# Patient Record
Sex: FEMALE | Race: WHITE | Employment: UNEMPLOYED | URBAN - METROPOLITAN AREA
[De-identification: names, ages, dates, MRNs, and addresses within clinical notes are randomized per-mention and may not be internally consistent; named-entity substitution may affect disease eponyms.]

---

## 2021-07-29 PROBLEM — N80.30 ENDOMETRIOSIS OF PELVIC PERITONEUM: Status: ACTIVE | Noted: 2019-08-15

## 2021-07-29 PROBLEM — L65.9 HAIR THINNING: Status: ACTIVE | Noted: 2019-08-15

## 2021-07-29 PROBLEM — R63.5 WEIGHT GAIN: Status: ACTIVE | Noted: 2019-08-15

## 2021-07-29 PROBLEM — R61 HYPERHIDROSIS: Status: ACTIVE | Noted: 2019-08-15

## 2021-09-13 PROBLEM — Z80.0 FH: COLON CANCER IN RELATIVE DIAGNOSED AT >50 YEARS OLD: Status: ACTIVE | Noted: 2021-09-13

## 2021-09-13 PROBLEM — K58.2 IRRITABLE BOWEL SYNDROME WITH BOTH CONSTIPATION AND DIARRHEA: Status: ACTIVE | Noted: 2021-09-13

## 2021-09-13 PROBLEM — Z86.010 HX OF COLONIC POLYPS: Status: ACTIVE | Noted: 2021-09-13

## 2021-09-13 PROBLEM — K62.5 RECTAL BLEEDING: Status: ACTIVE | Noted: 2021-09-13

## 2021-11-22 ENCOUNTER — HOSPITAL ENCOUNTER (OUTPATIENT)
Dept: LAB | Age: 46
Discharge: HOME | End: 2021-11-22

## 2021-11-22 PROCEDURE — 88305 TISSUE EXAM BY PATHOLOGIST: CPT

## 2021-11-30 PROBLEM — N80.C2: Status: ACTIVE | Noted: 2021-11-30

## 2021-12-30 ENCOUNTER — HOSPITAL ENCOUNTER (OUTPATIENT)
Dept: SURGERY | Age: 46
Discharge: HOME | End: 2021-12-30

## 2022-03-18 PROBLEM — Z80.0 FH: COLON CANCER IN RELATIVE DIAGNOSED AT >50 YEARS OLD: Status: ACTIVE | Noted: 2021-09-13

## 2022-03-19 PROBLEM — N80.30 ENDOMETRIOSIS OF PELVIC PERITONEUM: Status: ACTIVE | Noted: 2019-08-15

## 2022-03-19 PROBLEM — R63.5 WEIGHT GAIN: Status: ACTIVE | Noted: 2019-08-15

## 2022-03-19 PROBLEM — L65.9 HAIR THINNING: Status: ACTIVE | Noted: 2019-08-15

## 2022-03-19 PROBLEM — N80.C2: Status: ACTIVE | Noted: 2021-11-30

## 2022-03-19 PROBLEM — K58.2 IRRITABLE BOWEL SYNDROME WITH BOTH CONSTIPATION AND DIARRHEA: Status: ACTIVE | Noted: 2021-09-13

## 2022-03-20 PROBLEM — R61 HYPERHIDROSIS: Status: ACTIVE | Noted: 2019-08-15

## 2022-03-20 PROBLEM — K62.5 RECTAL BLEEDING: Status: ACTIVE | Noted: 2021-09-13

## 2022-03-20 PROBLEM — Z86.010 HX OF COLONIC POLYPS: Status: ACTIVE | Noted: 2021-09-13

## 2022-03-20 PROBLEM — Z86.0100 HX OF COLONIC POLYPS: Status: ACTIVE | Noted: 2021-09-13

## 2022-08-30 ENCOUNTER — HOSPITAL ENCOUNTER (EMERGENCY)
Age: 47
Discharge: HOME | End: 2022-08-30
Attending: EMERGENCY MEDICINE
Payer: MEDICARE

## 2022-08-30 ENCOUNTER — APPOINTMENT (OUTPATIENT)
Dept: GENERAL RADIOLOGY | Age: 47
End: 2022-08-30
Payer: MEDICARE

## 2022-08-30 VITALS
SYSTOLIC BLOOD PRESSURE: 106 MMHG | WEIGHT: 218 LBS | HEIGHT: 66 IN | TEMPERATURE: 99 F | BODY MASS INDEX: 35.03 KG/M2 | DIASTOLIC BLOOD PRESSURE: 76 MMHG | RESPIRATION RATE: 12 BRPM | HEART RATE: 83 BPM | OXYGEN SATURATION: 98 %

## 2022-08-30 DIAGNOSIS — R07.9 ACUTE CHEST PAIN: Primary | ICD-10-CM

## 2022-08-30 DIAGNOSIS — R07.89 CHEST WALL PAIN: ICD-10-CM

## 2022-08-30 LAB
ALBUMIN SERPL-MCNC: 4.4 G/DL (ref 3.5–5)
ALBUMIN/GLOB SERPL: 1.5 {RATIO}
ALP SERPL-CCNC: 90 U/L (ref 45–117)
ALT SERPL-CCNC: 36 U/L (ref 13–61)
ANION GAP SERPL CALC-SCNC: 10 MMOL/L (ref 7–16)
AST SERPL-CCNC: 29 U/L (ref 15–37)
BILIRUB SERPL-MCNC: <0.2 MG/DL (ref 0.2–1.1)
BUN SERPL-MCNC: 9 MG/DL (ref 7–18)
CALCIUM SERPL-MCNC: 9.1 MG/DL (ref 8.3–10.4)
CHLORIDE SERPL-SCNC: 109 MMOL/L (ref 98–107)
CO2 SERPL-SCNC: 24 MMOL/L (ref 21–32)
CREAT SERPL-MCNC: 0.85 MG/DL (ref 0.6–1)
D DIMER PPP FEU-MCNC: 0.29 UG/ML(FEU)
ERYTHROCYTE [DISTWIDTH] IN BLOOD BY AUTOMATED COUNT: 13.3 % (ref 11.9–14.6)
GLOBULIN SER CALC-MCNC: 3 G/DL (ref 2.3–3.5)
GLUCOSE SERPL-MCNC: 110 MG/DL (ref 65–100)
HCT VFR BLD AUTO: 39.7 % (ref 35.8–46.3)
HGB BLD-MCNC: 13.2 G/DL (ref 11.7–15.4)
MCH RBC QN AUTO: 29.2 PG (ref 26.1–32.9)
MCHC RBC AUTO-ENTMCNC: 33.2 G/DL (ref 31.4–35)
MCV RBC AUTO: 87.8 FL (ref 79.6–97.8)
NRBC # BLD: 0 K/UL (ref 0–0.2)
PLATELET # BLD AUTO: 331 K/UL (ref 150–450)
PMV BLD AUTO: 8.8 FL (ref 9.4–12.3)
POTASSIUM SERPL-SCNC: 3.6 MMOL/L (ref 3.5–5.1)
PROT SERPL-MCNC: 7.4 G/DL (ref 6.4–8.2)
RBC # BLD AUTO: 4.52 M/UL (ref 4.05–5.2)
SARS-COV-2 RDRP RESP QL NAA+PROBE: NOT DETECTED
SODIUM SERPL-SCNC: 143 MMOL/L (ref 136–145)
SOURCE: NORMAL
TROPONIN T SERPL HS-MCNC: <6 NG/L (ref 0–14)
WBC # BLD AUTO: 10.8 K/UL (ref 4.3–11.1)

## 2022-08-30 PROCEDURE — 71046 X-RAY EXAM CHEST 2 VIEWS: CPT

## 2022-08-30 PROCEDURE — 84484 ASSAY OF TROPONIN QUANT: CPT

## 2022-08-30 PROCEDURE — 6360000002 HC RX W HCPCS: Performed by: EMERGENCY MEDICINE

## 2022-08-30 PROCEDURE — 80053 COMPREHEN METABOLIC PANEL: CPT

## 2022-08-30 PROCEDURE — 85027 COMPLETE CBC AUTOMATED: CPT

## 2022-08-30 PROCEDURE — 87635 SARS-COV-2 COVID-19 AMP PRB: CPT

## 2022-08-30 PROCEDURE — 96374 THER/PROPH/DIAG INJ IV PUSH: CPT

## 2022-08-30 PROCEDURE — 96375 TX/PRO/DX INJ NEW DRUG ADDON: CPT

## 2022-08-30 PROCEDURE — 99285 EMERGENCY DEPT VISIT HI MDM: CPT

## 2022-08-30 PROCEDURE — 85379 FIBRIN DEGRADATION QUANT: CPT

## 2022-08-30 RX ORDER — CYCLOBENZAPRINE HCL 10 MG
10 TABLET ORAL 3 TIMES DAILY PRN
Qty: 21 TABLET | Refills: 0 | Status: SHIPPED | OUTPATIENT
Start: 2022-08-30 | End: 2022-09-09

## 2022-08-30 RX ORDER — KETOROLAC TROMETHAMINE 15 MG/ML
15 INJECTION, SOLUTION INTRAMUSCULAR; INTRAVENOUS
Status: COMPLETED | OUTPATIENT
Start: 2022-08-30 | End: 2022-08-30

## 2022-08-30 RX ORDER — MORPHINE SULFATE 2 MG/ML
2 INJECTION, SOLUTION INTRAMUSCULAR; INTRAVENOUS
Status: COMPLETED | OUTPATIENT
Start: 2022-08-30 | End: 2022-08-30

## 2022-08-30 RX ORDER — IBUPROFEN 800 MG/1
800 TABLET ORAL
Qty: 15 TABLET | Refills: 0 | Status: SHIPPED | OUTPATIENT
Start: 2022-08-30 | End: 2022-09-04

## 2022-08-30 RX ADMIN — KETOROLAC TROMETHAMINE 15 MG: 15 INJECTION, SOLUTION INTRAMUSCULAR; INTRAVENOUS at 22:38

## 2022-08-30 RX ADMIN — MORPHINE SULFATE 2 MG: 2 INJECTION, SOLUTION INTRAMUSCULAR; INTRAVENOUS at 22:42

## 2022-08-30 ASSESSMENT — PAIN DESCRIPTION - DESCRIPTORS
DESCRIPTORS: SHOOTING
DESCRIPTORS: SHOOTING
DESCRIPTORS: SHARP

## 2022-08-30 ASSESSMENT — PAIN SCALES - GENERAL
PAINLEVEL_OUTOF10: 2
PAINLEVEL_OUTOF10: 2
PAINLEVEL_OUTOF10: 8
PAINLEVEL_OUTOF10: 7
PAINLEVEL_OUTOF10: 7

## 2022-08-30 ASSESSMENT — ENCOUNTER SYMPTOMS
ABDOMINAL PAIN: 0
BACK PAIN: 1
SHORTNESS OF BREATH: 0
COUGH: 0
VOMITING: 0

## 2022-08-30 ASSESSMENT — PAIN DESCRIPTION - LOCATION
LOCATION: BACK
LOCATION: BACK
LOCATION: BACK;ARM
LOCATION: BACK
LOCATION: CHEST

## 2022-08-30 ASSESSMENT — PAIN DESCRIPTION - FREQUENCY: FREQUENCY: CONTINUOUS

## 2022-08-30 ASSESSMENT — PAIN DESCRIPTION - ORIENTATION
ORIENTATION: MID
ORIENTATION: LEFT
ORIENTATION: UPPER
ORIENTATION: UPPER

## 2022-08-30 ASSESSMENT — PAIN DESCRIPTION - PAIN TYPE: TYPE: ACUTE PAIN

## 2022-08-30 ASSESSMENT — PAIN - FUNCTIONAL ASSESSMENT
PAIN_FUNCTIONAL_ASSESSMENT: 0-10
PAIN_FUNCTIONAL_ASSESSMENT: 0-10

## 2022-08-30 NOTE — ED TRIAGE NOTES
Pt ambulates to triage under NAD at this time.  Reports mid chest pain for approx 2 weeks that radiates to her back and left arm
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
## 2022-08-31 NOTE — ED NOTES
I have reviewed discharge instructions with the patient. The patient verbalized understanding. Patient left ED via Discharge Method: ambulatory to Home with family member. Opportunity for questions and clarification provided. Patient given 2 scripts. To continue your aftercare when you leave the hospital, you may receive an automated call from our care team to check in on how you are doing. This is a free service and part of our promise to provide the best care and service to meet your aftercare needs.  If you have questions, or wish to unsubscribe from this service please call [phone]. Thank you for Choosing our [facility] Emergency Department.         Niall Plascencia RN  08/30/22 [id number]

## 2022-08-31 NOTE — DISCHARGE INSTRUCTIONS
Rest.  Heating pad or hot water bottle. Medications as directed. No obvious signs of a heart attack. If pain persist, you may consider calling cardiology for appointment to recheck to see if further testing is indicated. Also call your primary care doctor to recheck as well. Recheck sooner for worse pain high fever rash or shortness of breath.

## 2022-09-06 DIAGNOSIS — L65.9 HAIR THINNING: ICD-10-CM

## 2022-09-06 DIAGNOSIS — Z11.59 ENCOUNTER FOR HEPATITIS C SCREENING TEST FOR LOW RISK PATIENT: ICD-10-CM

## 2022-09-06 DIAGNOSIS — Z00.00 LABORATORY EXAMINATION ORDERED AS PART OF A COMPLETE PHYSICAL EXAMINATION: ICD-10-CM

## 2022-09-06 DIAGNOSIS — E55.9 VITAMIN D DEFICIENCY: Primary | ICD-10-CM

## 2022-09-09 ENCOUNTER — NURSE ONLY (OUTPATIENT)
Dept: FAMILY MEDICINE CLINIC | Facility: CLINIC | Age: 47
End: 2022-09-09
Payer: MEDICARE

## 2022-09-09 DIAGNOSIS — Z11.59 ENCOUNTER FOR HEPATITIS C SCREENING TEST FOR LOW RISK PATIENT: ICD-10-CM

## 2022-09-09 DIAGNOSIS — E55.9 VITAMIN D DEFICIENCY: ICD-10-CM

## 2022-09-09 DIAGNOSIS — L65.9 HAIR THINNING: ICD-10-CM

## 2022-09-09 DIAGNOSIS — Z00.00 LABORATORY EXAMINATION ORDERED AS PART OF A COMPLETE PHYSICAL EXAMINATION: ICD-10-CM

## 2022-09-09 LAB
25(OH)D3 SERPL-MCNC: 53.5 NG/ML (ref 30–100)
ALBUMIN SERPL-MCNC: 3.6 G/DL (ref 3.5–5)
ALBUMIN/GLOB SERPL: 1 {RATIO} (ref 1.2–3.5)
ALP SERPL-CCNC: 88 U/L (ref 50–136)
ALT SERPL-CCNC: 39 U/L (ref 12–65)
ANION GAP SERPL CALC-SCNC: 8 MMOL/L (ref 4–13)
AST SERPL-CCNC: 14 U/L (ref 15–37)
BILIRUB SERPL-MCNC: 0.3 MG/DL (ref 0.2–1.1)
BILIRUBIN, URINE, POC: NEGATIVE
BLOOD URINE, POC: NEGATIVE
BUN SERPL-MCNC: 13 MG/DL (ref 6–23)
CALCIUM SERPL-MCNC: 9.1 MG/DL (ref 8.3–10.4)
CHLORIDE SERPL-SCNC: 110 MMOL/L (ref 101–110)
CHOLEST SERPL-MCNC: 186 MG/DL
CO2 SERPL-SCNC: 23 MMOL/L (ref 21–32)
CREAT SERPL-MCNC: 1 MG/DL (ref 0.6–1)
GLOBULIN SER CALC-MCNC: 3.5 G/DL (ref 2.3–3.5)
GLUCOSE SERPL-MCNC: 110 MG/DL (ref 65–100)
GLUCOSE URINE, POC: NEGATIVE
GRANS ABSOLUTE, POC: 7.8 K/UL
GRANULOCYTES %, POC: 65.7 %
HCV AB SER QL: NONREACTIVE
HDLC SERPL-MCNC: 49 MG/DL (ref 40–60)
HDLC SERPL: 3.8 {RATIO}
HEMATOCRIT, POC: 43.7 %
HEMOGLOBIN, POC: 13.9 G/DL
HIV 1+2 AB+HIV1 P24 AG SERPL QL IA: NONREACTIVE
HIV 1/2 RESULT COMMENT: NORMAL
KETONES, URINE, POC: NEGATIVE
LDLC SERPL CALC-MCNC: 113.2 MG/DL
LEUKOCYTE ESTERASE, URINE, POC: NEGATIVE
LYMPHOCYTE %, POC: 28 %
LYMPHS ABSOLUTE, POC: 33.3 K/UL
MCH, POC: 29.3 PG (ref 40–?)
MCHC, POC: 31.8
MCV, POC: 92.2
MONOCYTE %, POC: 6.3 %
MONOCYTE, ABSOLUTE POC: 0.7 K/UL
MPV, POC: 7.4 FL
NITRITE, URINE, POC: NEGATIVE
PH, URINE, POC: 6 (ref 4.6–8)
PLATELET COUNT, POC: 382 K/UL
POTASSIUM SERPL-SCNC: 4 MMOL/L (ref 3.5–5.1)
PROT SERPL-MCNC: 7.1 G/DL (ref 6.3–8.2)
PROTEIN,URINE, POC: NEGATIVE
RBC, POC: 4.74 M/UL
RDW, POC: 14.5 %
SODIUM SERPL-SCNC: 141 MMOL/L (ref 136–145)
SPECIFIC GRAVITY, URINE, POC: 1.02 (ref 1–1.03)
TRIGL SERPL-MCNC: 119 MG/DL (ref 35–150)
TSH, 3RD GENERATION: 0.89 UIU/ML (ref 0.36–3.74)
URINALYSIS CLARITY, POC: CLEAR
URINALYSIS COLOR, POC: YELLOW
UROBILINOGEN, POC: NORMAL
VLDLC SERPL CALC-MCNC: 23.8 MG/DL (ref 6–23)
WBC, POC: ABNORMAL K/UL

## 2022-09-09 PROCEDURE — 85025 COMPLETE CBC W/AUTO DIFF WBC: CPT | Performed by: FAMILY MEDICINE

## 2022-09-09 PROCEDURE — 81003 URINALYSIS AUTO W/O SCOPE: CPT | Performed by: FAMILY MEDICINE

## 2022-09-13 ENCOUNTER — OFFICE VISIT (OUTPATIENT)
Dept: FAMILY MEDICINE CLINIC | Facility: CLINIC | Age: 47
End: 2022-09-13
Payer: MEDICARE

## 2022-09-13 VITALS
OXYGEN SATURATION: 99 % | WEIGHT: 218 LBS | HEART RATE: 99 BPM | SYSTOLIC BLOOD PRESSURE: 132 MMHG | DIASTOLIC BLOOD PRESSURE: 86 MMHG | BODY MASS INDEX: 35.19 KG/M2

## 2022-09-13 DIAGNOSIS — K59.09 OTHER CONSTIPATION: ICD-10-CM

## 2022-09-13 DIAGNOSIS — R68.89 HEAT INTOLERANCE: ICD-10-CM

## 2022-09-13 DIAGNOSIS — R73.01 IMPAIRED FASTING GLUCOSE: ICD-10-CM

## 2022-09-13 DIAGNOSIS — R63.5 WEIGHT GAIN: ICD-10-CM

## 2022-09-13 DIAGNOSIS — R05.3 PERSISTENT DRY COUGH: ICD-10-CM

## 2022-09-13 DIAGNOSIS — K29.00 OTHER ACUTE GASTRITIS WITHOUT HEMORRHAGE: ICD-10-CM

## 2022-09-13 DIAGNOSIS — R91.8 LUNG NODULE, MULTIPLE: ICD-10-CM

## 2022-09-13 DIAGNOSIS — M94.0 TIETZE SYNDROME: ICD-10-CM

## 2022-09-13 DIAGNOSIS — Z87.891 HISTORY OF TOBACCO ABUSE: ICD-10-CM

## 2022-09-13 DIAGNOSIS — D35.02 ADRENAL ADENOMA, LEFT: ICD-10-CM

## 2022-09-13 DIAGNOSIS — R07.9 CHEST PAIN, UNSPECIFIED TYPE: Primary | ICD-10-CM

## 2022-09-13 PROCEDURE — 93000 ELECTROCARDIOGRAM COMPLETE: CPT | Performed by: NURSE PRACTITIONER

## 2022-09-13 PROCEDURE — G8417 CALC BMI ABV UP PARAM F/U: HCPCS | Performed by: NURSE PRACTITIONER

## 2022-09-13 PROCEDURE — 96372 THER/PROPH/DIAG INJ SC/IM: CPT | Performed by: NURSE PRACTITIONER

## 2022-09-13 PROCEDURE — G8427 DOCREV CUR MEDS BY ELIG CLIN: HCPCS | Performed by: NURSE PRACTITIONER

## 2022-09-13 PROCEDURE — 99214 OFFICE O/P EST MOD 30 MIN: CPT | Performed by: NURSE PRACTITIONER

## 2022-09-13 PROCEDURE — 1036F TOBACCO NON-USER: CPT | Performed by: NURSE PRACTITIONER

## 2022-09-13 RX ORDER — ESOMEPRAZOLE MAGNESIUM 40 MG/1
40 CAPSULE, DELAYED RELEASE ORAL 2 TIMES DAILY
Qty: 180 CAPSULE | Refills: 1 | Status: SHIPPED | OUTPATIENT
Start: 2022-09-13

## 2022-09-13 RX ORDER — QUETIAPINE FUMARATE 300 MG/1
300 TABLET, FILM COATED ORAL
COMMUNITY
End: 2022-09-13

## 2022-09-13 RX ORDER — TRAZODONE HYDROCHLORIDE 100 MG/1
TABLET ORAL
COMMUNITY
End: 2022-10-18

## 2022-09-13 RX ORDER — ARIPIPRAZOLE 10 MG/1
TABLET ORAL
COMMUNITY
Start: 2022-07-04 | End: 2022-10-18

## 2022-09-13 RX ORDER — SEMAGLUTIDE 1.34 MG/ML
0.5 INJECTION, SOLUTION SUBCUTANEOUS WEEKLY
Qty: 12 ADJUSTABLE DOSE PRE-FILLED PEN SYRINGE | Refills: 3 | Status: SHIPPED | OUTPATIENT
Start: 2022-09-13

## 2022-09-13 RX ORDER — DEXTROAMPHETAMINE SACCHARATE, AMPHETAMINE ASPARTATE, DEXTROAMPHETAMINE SULFATE AND AMPHETAMINE SULFATE 5; 5; 5; 5 MG/1; MG/1; MG/1; MG/1
TABLET ORAL
COMMUNITY
Start: 2022-09-08 | End: 2022-10-18

## 2022-09-13 RX ORDER — AMOXICILLIN 250 MG
2 CAPSULE ORAL DAILY
Qty: 180 TABLET | Refills: 3 | Status: SHIPPED | OUTPATIENT
Start: 2022-09-13

## 2022-09-13 RX ORDER — GREEN TEA/HOODIA GORDONII 315-12.5MG
1 CAPSULE ORAL DAILY
Qty: 30 TABLET | Refills: 0 | Status: SHIPPED | OUTPATIENT
Start: 2022-09-13 | End: 2022-10-13

## 2022-09-13 RX ORDER — TRIAMCINOLONE ACETONIDE 40 MG/ML
40 INJECTION, SUSPENSION INTRA-ARTICULAR; INTRAMUSCULAR ONCE
Status: COMPLETED | OUTPATIENT
Start: 2022-09-13 | End: 2022-09-13

## 2022-09-13 RX ORDER — DOXYCYCLINE 100 MG/1
100 TABLET ORAL 2 TIMES DAILY
Qty: 20 TABLET | Refills: 0 | Status: SHIPPED | OUTPATIENT
Start: 2022-09-13 | End: 2022-09-23

## 2022-09-13 RX ORDER — FLUCONAZOLE 150 MG/1
TABLET ORAL
Qty: 3 TABLET | Refills: 0 | Status: SHIPPED | OUTPATIENT
Start: 2022-09-13 | End: 2022-10-18 | Stop reason: ALTCHOICE

## 2022-09-13 RX ORDER — SEMAGLUTIDE 1.34 MG/ML
INJECTION, SOLUTION SUBCUTANEOUS
COMMUNITY
Start: 2021-07-24 | End: 2022-09-13 | Stop reason: DRUGHIGH

## 2022-09-13 RX ORDER — KETOROLAC TROMETHAMINE 30 MG/ML
60 INJECTION, SOLUTION INTRAMUSCULAR; INTRAVENOUS ONCE
Status: COMPLETED | OUTPATIENT
Start: 2022-09-13 | End: 2022-09-13

## 2022-09-13 RX ADMIN — KETOROLAC TROMETHAMINE 60 MG: 30 INJECTION, SOLUTION INTRAMUSCULAR; INTRAVENOUS at 13:19

## 2022-09-13 RX ADMIN — TRIAMCINOLONE ACETONIDE 40 MG: 40 INJECTION, SUSPENSION INTRA-ARTICULAR; INTRAMUSCULAR at 13:20
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
## 2022-09-15 LAB
EST. AVERAGE GLUCOSE BLD GHB EST-MCNC: 137 MG/DL
HBA1C MFR BLD: 6.4 % (ref 4.8–5.6)

## 2022-09-29 ENCOUNTER — OFFICE VISIT (OUTPATIENT)
Dept: PULMONOLOGY | Age: 47
End: 2022-09-29
Payer: MEDICARE

## 2022-09-29 VITALS
HEART RATE: 105 BPM | OXYGEN SATURATION: 100 % | BODY MASS INDEX: 34.39 KG/M2 | RESPIRATION RATE: 14 BRPM | TEMPERATURE: 97.5 F | SYSTOLIC BLOOD PRESSURE: 109 MMHG | WEIGHT: 214 LBS | DIASTOLIC BLOOD PRESSURE: 74 MMHG | HEIGHT: 66 IN

## 2022-09-29 DIAGNOSIS — J40 BRONCHITIS: ICD-10-CM

## 2022-09-29 DIAGNOSIS — R91.1 LUNG NODULE: Primary | ICD-10-CM

## 2022-09-29 DIAGNOSIS — R07.9 CHEST PAIN, UNSPECIFIED TYPE: ICD-10-CM

## 2022-09-29 LAB
EXPIRATORY TIME: NORMAL
FEF 25-75% %PRED-PRE: NORMAL
FEF 25-75% PRED: NORMAL
FEF 25-75%-PRE: NORMAL
FEV1 %PRED-PRE: 93 %
FEV1 PRED: NORMAL
FEV1/FVC %PRED-PRE: NORMAL
FEV1/FVC PRED: NORMAL
FEV1/FVC: 80 %
FEV1: 2.85 L
FVC %PRED-PRE: 93 %
FVC PRED: NORMAL
FVC: 3.57 L
PEF %PRED-PRE: NORMAL
PEF PRED: NORMAL
PEF-PRE: NORMAL

## 2022-09-29 PROCEDURE — G8427 DOCREV CUR MEDS BY ELIG CLIN: HCPCS | Performed by: INTERNAL MEDICINE

## 2022-09-29 PROCEDURE — G8417 CALC BMI ABV UP PARAM F/U: HCPCS | Performed by: INTERNAL MEDICINE

## 2022-09-29 PROCEDURE — 99204 OFFICE O/P NEW MOD 45 MIN: CPT | Performed by: INTERNAL MEDICINE

## 2022-09-29 PROCEDURE — 94010 BREATHING CAPACITY TEST: CPT | Performed by: INTERNAL MEDICINE

## 2022-09-29 PROCEDURE — 1036F TOBACCO NON-USER: CPT | Performed by: INTERNAL MEDICINE

## 2022-09-29 RX ORDER — ALBUTEROL SULFATE 90 UG/1
2 AEROSOL, METERED RESPIRATORY (INHALATION) EVERY 6 HOURS PRN
Qty: 1 EACH | Refills: 11 | Status: SHIPPED | OUTPATIENT
Start: 2022-09-29

## 2022-09-29 RX ORDER — AZITHROMYCIN 250 MG/1
250 TABLET, FILM COATED ORAL SEE ADMIN INSTRUCTIONS
Qty: 6 TABLET | Refills: 0 | Status: SHIPPED | OUTPATIENT
Start: 2022-09-29 | End: 2022-10-04

## 2022-09-29 ASSESSMENT — PULMONARY FUNCTION TESTS
FEV1/FVC: 80
FEV1: 2.85
FEV1_PERCENT_PREDICTED_PRE: 93
FVC_PERCENT_PREDICTED_PRE: 93
FVC: 3.57
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
## 2022-10-07 RX ORDER — CELECOXIB 100 MG/1
100 CAPSULE ORAL 2 TIMES DAILY PRN
Qty: 60 CAPSULE | Refills: 5 | Status: SHIPPED | OUTPATIENT
Start: 2022-10-07

## 2022-10-18 ENCOUNTER — OFFICE VISIT (OUTPATIENT)
Dept: FAMILY MEDICINE CLINIC | Facility: CLINIC | Age: 47
End: 2022-10-18
Payer: MEDICARE

## 2022-10-18 VITALS
HEART RATE: 97 BPM | OXYGEN SATURATION: 99 % | DIASTOLIC BLOOD PRESSURE: 78 MMHG | BODY MASS INDEX: 33.89 KG/M2 | SYSTOLIC BLOOD PRESSURE: 126 MMHG | WEIGHT: 210 LBS

## 2022-10-18 DIAGNOSIS — K21.9 GASTROESOPHAGEAL REFLUX DISEASE WITHOUT ESOPHAGITIS: ICD-10-CM

## 2022-10-18 DIAGNOSIS — F31.62 BIPOLAR DISORDER, CURRENT EPISODE MIXED, MODERATE (HCC): ICD-10-CM

## 2022-10-18 DIAGNOSIS — J30.89 OTHER ALLERGIC RHINITIS: ICD-10-CM

## 2022-10-18 DIAGNOSIS — R05.3 PERSISTENT DRY COUGH: Primary | ICD-10-CM

## 2022-10-18 PROCEDURE — 1036F TOBACCO NON-USER: CPT | Performed by: NURSE PRACTITIONER

## 2022-10-18 PROCEDURE — G8417 CALC BMI ABV UP PARAM F/U: HCPCS | Performed by: NURSE PRACTITIONER

## 2022-10-18 PROCEDURE — G8484 FLU IMMUNIZE NO ADMIN: HCPCS | Performed by: NURSE PRACTITIONER

## 2022-10-18 PROCEDURE — G8427 DOCREV CUR MEDS BY ELIG CLIN: HCPCS | Performed by: NURSE PRACTITIONER

## 2022-10-18 PROCEDURE — 99214 OFFICE O/P EST MOD 30 MIN: CPT | Performed by: NURSE PRACTITIONER

## 2022-10-18 RX ORDER — FLUTICASONE PROPIONATE 50 MCG
2 SPRAY, SUSPENSION (ML) NASAL DAILY
Qty: 16 G | Refills: 5 | Status: SHIPPED | OUTPATIENT
Start: 2022-10-18

## 2022-10-18 RX ORDER — CETIRIZINE HYDROCHLORIDE 10 MG/1
10 TABLET ORAL DAILY
Qty: 90 TABLET | Refills: 1 | Status: SHIPPED | OUTPATIENT
Start: 2022-10-18

## 2022-10-18 ASSESSMENT — ANXIETY QUESTIONNAIRES
5. BEING SO RESTLESS THAT IT IS HARD TO SIT STILL: 0
GAD7 TOTAL SCORE: 15
3. WORRYING TOO MUCH ABOUT DIFFERENT THINGS: 3
2. NOT BEING ABLE TO STOP OR CONTROL WORRYING: 3
4. TROUBLE RELAXING: 3
7. FEELING AFRAID AS IF SOMETHING AWFUL MIGHT HAPPEN: 3
IF YOU CHECKED OFF ANY PROBLEMS ON THIS QUESTIONNAIRE, HOW DIFFICULT HAVE THESE PROBLEMS MADE IT FOR YOU TO DO YOUR WORK, TAKE CARE OF THINGS AT HOME, OR GET ALONG WITH OTHER PEOPLE: EXTREMELY DIFFICULT
6. BECOMING EASILY ANNOYED OR IRRITABLE: 3
1. FEELING NERVOUS, ANXIOUS, OR ON EDGE: 0

## 2022-10-18 ASSESSMENT — PATIENT HEALTH QUESTIONNAIRE - PHQ9
SUM OF ALL RESPONSES TO PHQ QUESTIONS 1-9: 17
SUM OF ALL RESPONSES TO PHQ QUESTIONS 1-9: 17
2. FEELING DOWN, DEPRESSED OR HOPELESS: 3
4. FEELING TIRED OR HAVING LITTLE ENERGY: 3
SUM OF ALL RESPONSES TO PHQ QUESTIONS 1-9: 17
8. MOVING OR SPEAKING SO SLOWLY THAT OTHER PEOPLE COULD HAVE NOTICED. OR THE OPPOSITE, BEING SO FIGETY OR RESTLESS THAT YOU HAVE BEEN MOVING AROUND A LOT MORE THAN USUAL: 2
SUM OF ALL RESPONSES TO PHQ9 QUESTIONS 1 & 2: 6
7. TROUBLE CONCENTRATING ON THINGS, SUCH AS READING THE NEWSPAPER OR WATCHING TELEVISION: 3
6. FEELING BAD ABOUT YOURSELF - OR THAT YOU ARE A FAILURE OR HAVE LET YOURSELF OR YOUR FAMILY DOWN: 0
1. LITTLE INTEREST OR PLEASURE IN DOING THINGS: 3
SUM OF ALL RESPONSES TO PHQ QUESTIONS 1-9: 17
10. IF YOU CHECKED OFF ANY PROBLEMS, HOW DIFFICULT HAVE THESE PROBLEMS MADE IT FOR YOU TO DO YOUR WORK, TAKE CARE OF THINGS AT HOME, OR GET ALONG WITH OTHER PEOPLE: 2
3. TROUBLE FALLING OR STAYING ASLEEP: 3
5. POOR APPETITE OR OVEREATING: 0
9. THOUGHTS THAT YOU WOULD BE BETTER OFF DEAD, OR OF HURTING YOURSELF: 0

## 2022-10-18 ASSESSMENT — ENCOUNTER SYMPTOMS
RECTAL PAIN: 0
ABDOMINAL PAIN: 0
EYE PAIN: 0
BLOOD IN STOOL: 0
EYE REDNESS: 0
STRIDOR: 0
ALLERGIC/IMMUNOLOGIC NEGATIVE: 1
ABDOMINAL DISTENTION: 0
RHINORRHEA: 0
PHOTOPHOBIA: 0
COLOR CHANGE: 0
BACK PAIN: 0
GASTROINTESTINAL NEGATIVE: 1
VOICE CHANGE: 0
FACIAL SWELLING: 0
WHEEZING: 0
NAUSEA: 0
CHOKING: 0
CONSTIPATION: 0
COUGH: 1
EYES NEGATIVE: 1
SINUS PRESSURE: 0
SINUS PAIN: 0
DIARRHEA: 0
SHORTNESS OF BREATH: 0
CHEST TIGHTNESS: 0
EYE ITCHING: 0
TROUBLE SWALLOWING: 0
ANAL BLEEDING: 0
EYE DISCHARGE: 0
APNEA: 0
VOMITING: 0
SORE THROAT: 0
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
## 2022-10-27 NOTE — TELEPHONE ENCOUNTER
Patient LM requesting rx refill on aygestin. After researching her chart, her last visit was a problem visit in 11/2021. I don't see a pap smear since 2014. Please advise.

## 2022-10-28 NOTE — TELEPHONE ENCOUNTER
We need to find out if she is still on Orilissa also.   We can refill both for one month but she needs visit for annual/recheck of endometriosis in Nov

## 2022-10-28 NOTE — TELEPHONE ENCOUNTER
Patient states she is not taking the [place] due to insurance not paying for it. She stated she is only taking aygestin. Patient is scheduled for 11/09 for AE.

## 2022-11-08 ENCOUNTER — NURSE TRIAGE (OUTPATIENT)
Dept: OTHER | Facility: CLINIC | Age: 47
End: 2022-11-08

## 2022-11-09 ENCOUNTER — OFFICE VISIT (OUTPATIENT)
Dept: OBGYN CLINIC | Age: 47
End: 2022-11-09
Payer: MEDICARE

## 2022-11-09 VITALS
DIASTOLIC BLOOD PRESSURE: 82 MMHG | BODY MASS INDEX: 33.59 KG/M2 | WEIGHT: 209 LBS | HEIGHT: 66 IN | SYSTOLIC BLOOD PRESSURE: 128 MMHG

## 2022-11-09 DIAGNOSIS — Z01.419 WOMEN'S ANNUAL ROUTINE GYNECOLOGICAL EXAMINATION: ICD-10-CM

## 2022-11-09 DIAGNOSIS — R63.5 WEIGHT GAIN: ICD-10-CM

## 2022-11-09 DIAGNOSIS — N80.30 ENDOMETRIOSIS OF PELVIC PERITONEUM: ICD-10-CM

## 2022-11-09 DIAGNOSIS — N95.1 MENOPAUSAL SYMPTOMS: ICD-10-CM

## 2022-11-09 PROCEDURE — 1036F TOBACCO NON-USER: CPT | Performed by: OBSTETRICS & GYNECOLOGY

## 2022-11-09 PROCEDURE — 99213 OFFICE O/P EST LOW 20 MIN: CPT | Performed by: OBSTETRICS & GYNECOLOGY

## 2022-11-09 PROCEDURE — G0101 CA SCREEN;PELVIC/BREAST EXAM: HCPCS | Performed by: OBSTETRICS & GYNECOLOGY

## 2022-11-09 PROCEDURE — G8417 CALC BMI ABV UP PARAM F/U: HCPCS | Performed by: OBSTETRICS & GYNECOLOGY

## 2022-11-09 PROCEDURE — G8484 FLU IMMUNIZE NO ADMIN: HCPCS | Performed by: OBSTETRICS & GYNECOLOGY

## 2022-11-09 PROCEDURE — G8427 DOCREV CUR MEDS BY ELIG CLIN: HCPCS | Performed by: OBSTETRICS & GYNECOLOGY

## 2022-11-09 ASSESSMENT — PATIENT HEALTH QUESTIONNAIRE - PHQ9: DEPRESSION UNABLE TO ASSESS: FUNCTIONAL CAPACITY MOTIVATION LIMITS ACCURACY

## 2022-11-09 NOTE — ASSESSMENT & PLAN NOTE
D/W pt possible she is perimenopausal - will check [address] today (recent other labs, sara TSH, wnl)
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
## 2022-11-09 NOTE — PROGRESS NOTES
Pt comes in today for AE and recheck on endometriosis. Pt wants hormones checked. LAST PAP:  10/27/2014 Negative, hpv negative     LAST MAMMO:  06/21/2021    LMP:  No LMP recorded. (Menstrual status: Chemically Induced).     BIRTH CONTROL:  OCP (progesterone only)    TOBACCO USE:  No    FAMILY HISTORY OF:   Breast Cancer:  Yes   Ovarian Cancer:  No   Uterine Cancer:  No   Colon Cancer:  Yes    Vitals:    11/09/22 1504   BP: 128/82   Site: Left Upper Arm   Position: Sitting   Weight: 209 lb (94.8 kg)   Height: 5' 6\" (1.676 m)        Berny Mayes MA  11/09/22  3:21 PM

## 2022-11-10 LAB — FSH SERPL-ACNC: 9.8 MIU/ML

## 2022-11-11 ENCOUNTER — TELEPHONE (OUTPATIENT)
Dept: OBGYN CLINIC | Age: 47
End: 2022-11-11

## 2022-11-11 NOTE — TELEPHONE ENCOUNTER
----- Message from Chantel Saeed MD sent at 11/10/2022 10:04 AM EST -----  Please let pt know that her lab work showed she is NOT menopausal

## 2022-11-15 LAB
CYTOLOGIST CVX/VAG CYTO: NORMAL
CYTOLOGY CVX/VAG DOC THIN PREP: NORMAL
HPV APTIMA: NEGATIVE
HPV GENOTYPE REFLEX: NORMAL
Lab: NORMAL
PATH REPORT.FINAL DX SPEC: NORMAL
STAT OF ADQ CVX/VAG CYTO-IMP: NORMAL

## 2022-11-23 RX ORDER — FLUCONAZOLE 150 MG/1
TABLET ORAL
Qty: 3 TABLET | Refills: 0 | OUTPATIENT
Start: 2022-11-23

## 2022-12-05 DIAGNOSIS — R73.01 IMPAIRED FASTING GLUCOSE: Primary | ICD-10-CM

## 2022-12-05 DIAGNOSIS — R63.5 WEIGHT GAIN: ICD-10-CM

## 2022-12-05 DIAGNOSIS — Z13.220 SCREENING FOR LIPID DISORDERS: ICD-10-CM

## 2022-12-05 DIAGNOSIS — R63.5 ABNORMAL WEIGHT GAIN: ICD-10-CM

## 2022-12-06 ENCOUNTER — NURSE ONLY (OUTPATIENT)
Dept: FAMILY MEDICINE CLINIC | Facility: CLINIC | Age: 47
End: 2022-12-06
Payer: MEDICARE

## 2022-12-06 ENCOUNTER — OFFICE VISIT (OUTPATIENT)
Dept: ENDOCRINOLOGY | Age: 47
End: 2022-12-06
Payer: MEDICARE

## 2022-12-06 VITALS
HEART RATE: 124 BPM | BODY MASS INDEX: 34.06 KG/M2 | WEIGHT: 211 LBS | DIASTOLIC BLOOD PRESSURE: 80 MMHG | SYSTOLIC BLOOD PRESSURE: 130 MMHG | OXYGEN SATURATION: 98 %

## 2022-12-06 DIAGNOSIS — E27.8 ADRENAL NODULE (HCC): ICD-10-CM

## 2022-12-06 DIAGNOSIS — R63.5 WEIGHT GAIN: ICD-10-CM

## 2022-12-06 DIAGNOSIS — R61 HYPERHIDROSIS: ICD-10-CM

## 2022-12-06 DIAGNOSIS — E27.8 ADRENAL NODULE (HCC): Primary | ICD-10-CM

## 2022-12-06 DIAGNOSIS — K21.9 GASTROESOPHAGEAL REFLUX DISEASE WITHOUT ESOPHAGITIS: ICD-10-CM

## 2022-12-06 DIAGNOSIS — Z13.220 SCREENING FOR LIPID DISORDERS: ICD-10-CM

## 2022-12-06 DIAGNOSIS — R73.01 IMPAIRED FASTING GLUCOSE: Primary | ICD-10-CM

## 2022-12-06 DIAGNOSIS — R73.01 IMPAIRED FASTING GLUCOSE: ICD-10-CM

## 2022-12-06 DIAGNOSIS — R93.5 ABNORMAL FINDINGS ON DIAGNOSTIC IMAGING OF OTHER ABDOMINAL REGIONS, INCLUDING RETROPERITONEUM: ICD-10-CM

## 2022-12-06 PROBLEM — E27.9 ADRENAL NODULE (HCC): Status: ACTIVE | Noted: 2022-12-06

## 2022-12-06 LAB
ALBUMIN SERPL-MCNC: 4.2 G/DL (ref 3.5–5)
ALBUMIN/GLOB SERPL: 1.3 {RATIO} (ref 0.4–1.6)
ALP SERPL-CCNC: 95 U/L (ref 50–136)
ALT SERPL-CCNC: 52 U/L (ref 12–65)
ANION GAP SERPL CALC-SCNC: 7 MMOL/L (ref 2–11)
AST SERPL-CCNC: 17 U/L (ref 15–37)
BASOPHILS # BLD: 0.1 K/UL (ref 0–0.2)
BASOPHILS NFR BLD: 1 % (ref 0–2)
BILIRUB SERPL-MCNC: 0.4 MG/DL (ref 0.2–1.1)
BUN SERPL-MCNC: 9 MG/DL (ref 6–23)
CALCIUM SERPL-MCNC: 9.2 MG/DL (ref 8.3–10.4)
CHLORIDE SERPL-SCNC: 110 MMOL/L (ref 101–110)
CHOLEST SERPL-MCNC: 203 MG/DL
CO2 SERPL-SCNC: 24 MMOL/L (ref 21–32)
CORTIS AM PEAK SERPL-MCNC: 5.8 UG/DL (ref 7–25)
CREAT SERPL-MCNC: 1.1 MG/DL (ref 0.6–1)
DIFFERENTIAL METHOD BLD: ABNORMAL
EOSINOPHIL # BLD: 0.2 K/UL (ref 0–0.8)
EOSINOPHIL NFR BLD: 1 % (ref 0.5–7.8)
ERYTHROCYTE [DISTWIDTH] IN BLOOD BY AUTOMATED COUNT: 13.6 % (ref 11.9–14.6)
GLOBULIN SER CALC-MCNC: 3.3 G/DL (ref 2.8–4.5)
GLUCOSE SERPL-MCNC: 93 MG/DL (ref 65–100)
HCT VFR BLD AUTO: 44.5 % (ref 35.8–46.3)
HDLC SERPL-MCNC: 44 MG/DL (ref 40–60)
HDLC SERPL: 4.6 {RATIO}
HGB BLD-MCNC: 14.1 G/DL (ref 11.7–15.4)
IMM GRANULOCYTES # BLD AUTO: 0.1 K/UL (ref 0–0.5)
IMM GRANULOCYTES NFR BLD AUTO: 1 % (ref 0–5)
LDLC SERPL CALC-MCNC: 141.4 MG/DL
LYMPHOCYTES # BLD: 2.6 K/UL (ref 0.5–4.6)
LYMPHOCYTES NFR BLD: 22 % (ref 13–44)
MCH RBC QN AUTO: 29.1 PG (ref 26.1–32.9)
MCHC RBC AUTO-ENTMCNC: 31.7 G/DL (ref 31.4–35)
MCV RBC AUTO: 91.9 FL (ref 82–102)
MONOCYTES # BLD: 0.6 K/UL (ref 0.1–1.3)
MONOCYTES NFR BLD: 6 % (ref 4–12)
NEUTS SEG # BLD: 8 K/UL (ref 1.7–8.2)
NEUTS SEG NFR BLD: 69 % (ref 43–78)
NRBC # BLD: 0 K/UL (ref 0–0.2)
PLATELET # BLD AUTO: 414 K/UL (ref 150–450)
PMV BLD AUTO: 9.2 FL (ref 9.4–12.3)
POTASSIUM SERPL-SCNC: 4 MMOL/L (ref 3.5–5.1)
PROT SERPL-MCNC: 7.5 G/DL (ref 6.3–8.2)
RBC # BLD AUTO: 4.84 M/UL (ref 4.05–5.2)
SODIUM SERPL-SCNC: 141 MMOL/L (ref 133–143)
TRIGL SERPL-MCNC: 88 MG/DL (ref 35–150)
VLDLC SERPL CALC-MCNC: 17.6 MG/DL (ref 6–23)
WBC # BLD AUTO: 11.5 K/UL (ref 4.3–11.1)

## 2022-12-06 PROCEDURE — G8427 DOCREV CUR MEDS BY ELIG CLIN: HCPCS | Performed by: INTERNAL MEDICINE

## 2022-12-06 PROCEDURE — 36415 COLL VENOUS BLD VENIPUNCTURE: CPT | Performed by: NURSE PRACTITIONER

## 2022-12-06 PROCEDURE — G8417 CALC BMI ABV UP PARAM F/U: HCPCS | Performed by: INTERNAL MEDICINE

## 2022-12-06 PROCEDURE — 99204 OFFICE O/P NEW MOD 45 MIN: CPT | Performed by: INTERNAL MEDICINE

## 2022-12-06 PROCEDURE — G8484 FLU IMMUNIZE NO ADMIN: HCPCS | Performed by: INTERNAL MEDICINE

## 2022-12-06 PROCEDURE — 1036F TOBACCO NON-USER: CPT | Performed by: INTERNAL MEDICINE

## 2022-12-06 RX ORDER — DEXAMETHASONE 1 MG
TABLET ORAL
Qty: 1 TABLET | Refills: 0 | Status: SHIPPED | OUTPATIENT
Start: 2022-12-06

## 2022-12-06 RX ORDER — ATOMOXETINE 40 MG/1
CAPSULE ORAL
COMMUNITY
Start: 2022-12-05

## 2022-12-06 RX ORDER — TOPIRAMATE 100 MG/1
TABLET, FILM COATED ORAL
COMMUNITY
Start: 2022-11-07

## 2022-12-06 RX ORDER — LAMOTRIGINE 200 MG/1
200 TABLET ORAL DAILY
COMMUNITY

## 2022-12-06 ASSESSMENT — ENCOUNTER SYMPTOMS: SHORTNESS OF BREATH: 0

## 2022-12-06 NOTE — PROGRESS NOTES
Khoi Robin MD, [facility]  [address], [address] [facility], [address] [place] [address]  Phone [phone]  Facsimile [phone]          Christina Aparicio is a 52 y.o. female seen 12/6/2022 at the request of Claudia Browne NP for the evaluation of left adrenal adenoma        ASSESSMENT AND PLAN:    1. Adrenal nodule ([address]  She was incidentally noted to have a left adrenal nodule measuring 1.4 cm on a CT scan of the chest with contrast.  The nodule is therefore phenotypically indeterminant and I will order a noncontrasted CT scan of the abdomen for further evaluation. I will order a 1 mg overnight dexamethasone suppression test, plasma aldosterone to renin ratio and plasma metanephrines to exclude Cushing's syndrome, primary hyperaldosteronism and pheochromocytoma. I will also check a DHEA-S level to exclude adrenal hyperandrogenism given her complaints of mild hirsutism. It should be noted that she takes multiple medications which may cause false positive testing for plasma metanephrines. 2. Abnormal findings on diagnostic imaging of other abdominal regions, including retroperitoneum   See above discussion. 3. Weight gain  I will exclude Cushing's syndrome. 4. Hyperhidrosis  I will try and exclude pheochromocytoma. Follow-up and Dispositions    Return To be determined. HISTORY OF PRESENT ILLNESS:    ADRENAL NODULE    Presentation/Diagnosis: Incidentally noted on chest CT. Symptoms: No history of hypertension. No history of hypokalemia. Denies polyuria, polydipsia. She has some muscle cramps in her left leg. She has ADD and previously took Adderall. It was stopped when her adrenal nodule was discovered, but she was switched to Strattera. She reports spells of hot flashes and diaphoresis. She has a history of chronic tachycardia. Denies pallor, headaches, tremors, paroxysmal hypertension.   She reports that her weight increased 70 pounds within 1 year around 4 years ago. She started Ozempic and Topamax around 2 years ago and she states that she has only lost 10 pounds. She has history of prediabetes based on a hemoglobin A1c of 6.4 9/2022 (despite taking Ozempic). She reports central obesity. Denies proximal muscle weakness. She reports easy bruising. Denies impaired wound healing, thinning of the skin. She reports thin, white striae. She has 1 wide violaceous stria. She has a history of bipolar disorder. No menses because she takes norethindrone for endometriosis. She reports hirsutism on her face and chest that she plucks and waxes. Treatment: None. Imaging:  CT chest with contrast 9/2/2022: Oval-shaped left adrenal nodule measuring 1.4 x 1.1 cm with Hounsfield units of 16. Labs:  9/9/2022: Sodium 141, potassium 4.0, glucose 110, creatinine 1.00 (GFR >60), TSH 0.888.  11/9/2022: FSH 9.8. Pregnancy status: No pregnancy plans. Review of Systems   Respiratory:  Negative for shortness of breath (when she gets over heated). Cardiovascular:  Negative for chest pain. Vital Signs:  /80   Pulse (!) 124   Wt 211 lb (95.7 kg)   SpO2 98%   BMI 34.06 kg/m²     Physical Exam  Constitutional:       General: She is not in acute distress. Neck:      Thyroid: No thyroid mass or thyromegaly. Cardiovascular:      Rate and Rhythm: Normal rate and regular rhythm. Lymphadenopathy:      Cervical: No cervical adenopathy. Neurological:      Motor: No tremor.          Orders Placed This Encounter   Procedures    CT ABDOMEN WO CONTRAST Additional Contrast? None     Standing Status:   Future     Standing Expiration Date:   12/6/2023     Order Specific Question:   Additional Contrast?     Answer:   None    ACTH     Standing Status:   Future     Number of Occurrences:   1     Standing Expiration Date:   12/6/2023    Aldosterone     Standing Status:   Future     Number of Occurrences:   1     Standing Expiration Date: 12/6/2023    Renin     Standing Status:   Future     Number of Occurrences:   1     Standing Expiration Date:   12/6/2023    Metanephrines Plasma Free     Standing Status:   Future     Number of Occurrences:   1     Standing Expiration Date:   12/6/2023    DHEA-Sulfate     Standing Status:   Future     Number of Occurrences:   1     Standing Expiration Date:   87/8/4085    Basic Metabolic Panel     Standing Status:   Future     Number of Occurrences:   1     Standing Expiration Date:   12/6/2023    Cortisol AM, Total     Standing Status:   Future     Number of Occurrences:   1     Standing Expiration Date:   12/6/2023    Dexamethasone     Standing Status:   Future     Number of Occurrences:   1     Standing Expiration Date:   12/6/2023         Current Outpatient Medications   Medication Sig Dispense Refill    atomoxetine (STRATTERA) 40 MG capsule       topiramate (TOPAMAX) 100 MG tablet TAKE 1 TABLET BY MOUTH EVERY DAY      lamoTRIgine (LAMICTAL) 200 MG tablet Take 200 mg by mouth daily      dexamethasone (DECADRON) 1 MG tablet 1 tablet at 11 PM the night prior to labs 1 tablet 0    norethindrone (AYGESTIN) 5 MG tablet Take 1 tablet by mouth daily 30 tablet 11    celecoxib (CELEBREX) 100 MG capsule Take 1 capsule by mouth 2 times daily as needed for Pain (Patient taking differently: Take 100 mg by mouth 2 times daily as needed for Pain Prn) 60 capsule 5    senna-docusate (PERICOLACE) 8.6-50 MG per tablet Take 2 tablets by mouth daily For constipation 180 tablet 3    Semaglutide,0.25 or 0.5MG/DOS, (OZEMPIC, 0.25 OR 0.5 MG/DOSE,) 2 MG/1.5ML SOPN Inject 0.5 mg into the skin once a week 12 Adjustable Dose Pre-filled Pen Syringe 3    esomeprazole (NEXIUM) 40 MG delayed release capsule Take 1 capsule by mouth in the morning and at bedtime On empty stomach and wait at least 30 minutes before eating for stomach reflux 180 capsule 1    citalopram (CELEXA) 40 MG tablet Take 40 mg by mouth every evening      clonazePAM (KLONOPIN) 1 MG tablet TAKE ONE TABLET BY MOUTH TWICE A DAY      hydrocortisone 2.5 % cream Place rectally 4 times daily      zaleplon (SONATA) 10 MG capsule TAKE 1 CAPSULE BY MOUTH EVERY DAY AT NIGHT      ibuprofen (ADVIL;MOTRIN) 800 MG tablet Take 1 tablet by mouth 3 times daily (with meals) for 5 days 15 tablet 0     No current facility-administered medications for this visit.

## 2022-12-07 LAB
ANION GAP SERPL CALC-SCNC: 5 MMOL/L (ref 2–11)
BUN SERPL-MCNC: 10 MG/DL (ref 6–23)
CALCIUM SERPL-MCNC: 9.4 MG/DL (ref 8.3–10.4)
CHLORIDE SERPL-SCNC: 112 MMOL/L (ref 101–110)
CO2 SERPL-SCNC: 21 MMOL/L (ref 21–32)
CREAT SERPL-MCNC: 0.9 MG/DL (ref 0.6–1)
EST. AVERAGE GLUCOSE BLD GHB EST-MCNC: 114 MG/DL
GLUCOSE SERPL-MCNC: 88 MG/DL (ref 65–100)
HBA1C MFR BLD: 5.6 % (ref 4.8–5.6)
POTASSIUM SERPL-SCNC: 3.8 MMOL/L (ref 3.5–5.1)
SODIUM SERPL-SCNC: 138 MMOL/L (ref 133–143)

## 2022-12-08 DIAGNOSIS — E27.8 ADRENAL NODULE (HCC): Primary | ICD-10-CM

## 2022-12-08 LAB
ACTH PLAS-MCNC: 4.1 PG/ML (ref 7.2–63.3)
DHEA-S SERPL-MCNC: 105 UG/DL (ref 41.2–243.7)

## 2022-12-09 PROBLEM — Z01.419 WOMEN'S ANNUAL ROUTINE GYNECOLOGICAL EXAMINATION: Status: RESOLVED | Noted: 2022-11-09 | Resolved: 2022-12-09

## 2022-12-11 LAB
METANEPH FREE SERPL-MCNC: 32.3 PG/ML (ref 0–88)
NORMETANEPHRINE SERPL-MCNC: 147.5 PG/ML (ref 0–218.9)

## 2022-12-13 ENCOUNTER — TELEPHONE (OUTPATIENT)
Dept: ENDOCRINOLOGY | Age: 47
End: 2022-12-13

## 2022-12-13 DIAGNOSIS — E27.8 ADRENAL NODULE (HCC): Primary | ICD-10-CM

## 2022-12-13 NOTE — TELEPHONE ENCOUNTER
She was supposed to have a 1 mg overnight dexamethasone suppression test with an 8AM cortisol level, but the lab ran her cortisol level at her visit. I placed a new order for the 8AM cortisol but the lab needs to credit her.   Please check with her to see when she will do the test.

## 2022-12-16 DIAGNOSIS — E27.8 ADRENAL NODULE (HCC): ICD-10-CM

## 2022-12-16 LAB — CORTIS AM PEAK SERPL-MCNC: 1.7 UG/DL (ref 7–25)

## 2022-12-20 NOTE — TELEPHONE ENCOUNTER
Rocio Anna had the patient come for a redraw on 12/16/22 I called the patient to see if she got the Dexamethasone at a [facility] and to talk to her about billing.   I want to let her know that [facility] will credit her for the labs drawn incorrectly

## 2022-12-20 NOTE — TELEPHONE ENCOUNTER
Patient's mailbox is full. Attempted to reach her to see if she was able to have her dexamethasone suppression test and AM cortisol redone and where she picked up the dexamethasone tablet.

## 2022-12-26 LAB — DEXAMETHASONE SERPL-MCNC: 329 NG/DL

## 2022-12-27 ENCOUNTER — OFFICE VISIT (OUTPATIENT)
Dept: FAMILY MEDICINE CLINIC | Facility: CLINIC | Age: 47
End: 2022-12-27
Payer: MEDICARE

## 2022-12-27 DIAGNOSIS — J32.9 RECURRENT SINUSITIS: ICD-10-CM

## 2022-12-27 DIAGNOSIS — R73.03 PREDIABETES: ICD-10-CM

## 2022-12-27 DIAGNOSIS — E55.9 VITAMIN D DEFICIENCY: ICD-10-CM

## 2022-12-27 DIAGNOSIS — J02.9 ACUTE PHARYNGITIS, UNSPECIFIED ETIOLOGY: Primary | ICD-10-CM

## 2022-12-27 PROCEDURE — 99214 OFFICE O/P EST MOD 30 MIN: CPT | Performed by: NURSE PRACTITIONER

## 2022-12-27 PROCEDURE — G8427 DOCREV CUR MEDS BY ELIG CLIN: HCPCS | Performed by: NURSE PRACTITIONER

## 2022-12-27 RX ORDER — AZITHROMYCIN 250 MG/1
TABLET, FILM COATED ORAL
Qty: 6 TABLET | Refills: 0 | Status: SHIPPED | OUTPATIENT
Start: 2022-12-27

## 2022-12-27 RX ORDER — METHYLPREDNISOLONE 8 MG/1
TABLET ORAL
Qty: 11 TABLET | Refills: 0 | Status: SHIPPED | OUTPATIENT
Start: 2022-12-27
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
## 2022-12-29 ENCOUNTER — TELEPHONE (OUTPATIENT)
Dept: NUTRITION | Age: 47
End: 2022-12-29

## 2022-12-29 NOTE — TELEPHONE ENCOUNTER
Nutrition Counseling: Contacted pt regarding referral. See notes documented in Nutrition Counseling Referral for details. No further follow-up contact from pt. Will close referral for this office and notify referring provider by phone.     [facility]  [phone]

## 2023-02-02 ENCOUNTER — TELEPHONE (OUTPATIENT)
Dept: NUTRITION | Age: 48
End: 2023-02-02

## 2023-02-02 NOTE — TELEPHONE ENCOUNTER
Nutrition Counseling: Contacted pt regarding referral. See notes documented in Nutrition Counseling Referral for details. No further follow-up contact from pt. Will close referral for this office.     [address] [facility]  [phone]

## 2023-02-21 RX ORDER — ESOMEPRAZOLE MAGNESIUM 40 MG/1
CAPSULE, DELAYED RELEASE ORAL
Qty: 180 CAPSULE | Refills: 1 | OUTPATIENT
Start: 2023-02-21

## 2023-03-21 ENCOUNTER — PATIENT MESSAGE (OUTPATIENT)
Dept: FAMILY MEDICINE CLINIC | Facility: CLINIC | Age: 48
End: 2023-03-21

## 2023-03-26 NOTE — TELEPHONE ENCOUNTER
From: Chandu Cervantes  To: Osmany Garsia  Sent: 3/21/2023 2:47 PM EDT  Subject: Referral for nutritionist     Hello, I was given a referral by the gastroenterologist but they didnt take my insurance. I have found a doctor in [place] [facility] that will take my insurance but they require a referral for a new patient. The doctor is Arias Patel at [phone] and the fax is [phone]. Im up to 220 lbs and I need to take action. Any help I can get Im game. I never had a weight problem until 43 and I ate whatever I wanted, I dont know what to eat. I never had to worry.  Help

## 2023-04-18 NOTE — TELEPHONE ENCOUNTER
Patient Refill Request     Last Office Visit: 09/29/2022 with Dr. Danna Wen     When they were supposed to follow up: 6 months     Upcoming Office Visit: 05/08/23     Refills Requested: Celebrex 100 mg     Type of refill: 30 day     Requested Pharmacy: [facility]      Is prescription pended?  Yes

## 2023-04-19 RX ORDER — CELECOXIB 100 MG/1
100 CAPSULE ORAL 2 TIMES DAILY PRN
Qty: 60 CAPSULE | Refills: 5 | Status: SHIPPED | OUTPATIENT
Start: 2023-04-19

## 2023-05-17 ENCOUNTER — TELEPHONE (OUTPATIENT)
Dept: OBGYN CLINIC | Age: 48
End: 2023-05-17

## 2023-05-17 NOTE — TELEPHONE ENCOUNTER
Patient sent the below My Chart message:    I was having great difficulties with sweating, rapid heart rate and night sweats. I stopped taking Aygestin and all of my awful life destroying symptoms have ended. I couldnt even sit still without a hot flash. I need to try another method but my insurance will not cover orlissa without trying some chemotherapy drug first.[place]. Would my insurance cover a hysterectomy? Do I need an appointment? Help  Yanira     I then told her she would need an appointment to discuss surgery. She then sent the My Chart message below:      I called my advocate. Okay this year, my insurance will cover Jeevan Peals with a PA sent to [phone]. It just must state that this is the best Med for me over Agystin due to the complications and also lupron. My pharmacy is [facility] [phone]. Dr. Isaias Carbone,   Can we send in a rx for Jeevan Peals so that I can request a PA? She was denied before. We last wrote the rx in 2021.

## 2023-05-28 ENCOUNTER — HOSPITAL ENCOUNTER (EMERGENCY)
Age: 48
Discharge: HOME | End: 2023-05-28
Attending: STUDENT IN AN ORGANIZED HEALTH CARE EDUCATION/TRAINING PROGRAM
Payer: MEDICARE

## 2023-05-28 ENCOUNTER — APPOINTMENT (OUTPATIENT)
Dept: GENERAL RADIOLOGY | Age: 48
End: 2023-05-28
Payer: MEDICARE

## 2023-05-28 VITALS
OXYGEN SATURATION: 93 % | HEIGHT: 66 IN | TEMPERATURE: 98.5 F | RESPIRATION RATE: 16 BRPM | DIASTOLIC BLOOD PRESSURE: 66 MMHG | BODY MASS INDEX: 33.75 KG/M2 | WEIGHT: 210 LBS | SYSTOLIC BLOOD PRESSURE: 95 MMHG | HEART RATE: 80 BPM

## 2023-05-28 DIAGNOSIS — R06.02 SHORTNESS OF BREATH: ICD-10-CM

## 2023-05-28 DIAGNOSIS — R07.9 CHEST PAIN, UNSPECIFIED TYPE: Primary | ICD-10-CM

## 2023-05-28 LAB
ANION GAP SERPL CALC-SCNC: 2 MMOL/L (ref 2–11)
BASOPHILS # BLD: 0.1 K/UL (ref 0–0.2)
BASOPHILS NFR BLD: 1 % (ref 0–2)
BUN SERPL-MCNC: 10 MG/DL (ref 6–23)
CALCIUM SERPL-MCNC: 8.5 MG/DL (ref 8.3–10.4)
CHLORIDE SERPL-SCNC: 114 MMOL/L (ref 101–110)
CO2 SERPL-SCNC: 21 MMOL/L (ref 21–32)
CREAT SERPL-MCNC: 0.9 MG/DL (ref 0.6–1)
D DIMER PPP FEU-MCNC: 0.39 UG/ML(FEU)
DIFFERENTIAL METHOD BLD: NORMAL
EOSINOPHIL # BLD: 0.4 K/UL (ref 0–0.8)
EOSINOPHIL NFR BLD: 4 % (ref 0.5–7.8)
ERYTHROCYTE [DISTWIDTH] IN BLOOD BY AUTOMATED COUNT: 14 % (ref 11.9–14.6)
GLUCOSE SERPL-MCNC: 97 MG/DL (ref 65–100)
HCT VFR BLD AUTO: 37.9 % (ref 35.8–46.3)
HGB BLD-MCNC: 12.1 G/DL (ref 11.7–15.4)
IMM GRANULOCYTES # BLD AUTO: 0.1 K/UL (ref 0–0.5)
IMM GRANULOCYTES NFR BLD AUTO: 1 % (ref 0–5)
LYMPHOCYTES # BLD: 2.6 K/UL (ref 0.5–4.6)
LYMPHOCYTES NFR BLD: 29 % (ref 13–44)
MCH RBC QN AUTO: 29.1 PG (ref 26.1–32.9)
MCHC RBC AUTO-ENTMCNC: 31.9 G/DL (ref 31.4–35)
MCV RBC AUTO: 91.1 FL (ref 82–102)
MONOCYTES # BLD: 0.6 K/UL (ref 0.1–1.3)
MONOCYTES NFR BLD: 7 % (ref 4–12)
NEUTS SEG # BLD: 5.2 K/UL (ref 1.7–8.2)
NEUTS SEG NFR BLD: 58 % (ref 43–78)
NRBC # BLD: 0 K/UL (ref 0–0.2)
NT PRO BNP: 63 PG/ML (ref 5–125)
PLATELET # BLD AUTO: 301 K/UL (ref 150–450)
PMV BLD AUTO: 9.9 FL (ref 9.4–12.3)
POTASSIUM SERPL-SCNC: 4.3 MMOL/L (ref 3.5–5.1)
RBC # BLD AUTO: 4.16 M/UL (ref 4.05–5.2)
SODIUM SERPL-SCNC: 137 MMOL/L (ref 133–143)
TROPONIN I SERPL HS-MCNC: <3 PG/ML (ref 0–37)
WBC # BLD AUTO: 8.9 K/UL (ref 4.3–11.1)

## 2023-05-28 PROCEDURE — 85379 FIBRIN DEGRADATION QUANT: CPT

## 2023-05-28 PROCEDURE — 99284 EMERGENCY DEPT VISIT MOD MDM: CPT

## 2023-05-28 PROCEDURE — 84484 ASSAY OF TROPONIN QUANT: CPT

## 2023-05-28 PROCEDURE — 85025 COMPLETE CBC W/AUTO DIFF WBC: CPT

## 2023-05-28 PROCEDURE — 80048 BASIC METABOLIC PNL TOTAL CA: CPT

## 2023-05-28 PROCEDURE — 71045 X-RAY EXAM CHEST 1 VIEW: CPT

## 2023-05-28 PROCEDURE — 83880 ASSAY OF NATRIURETIC PEPTIDE: CPT

## 2023-05-28 RX ORDER — SUCRALFATE 1 G/1
1 TABLET ORAL 4 TIMES DAILY
Qty: 40 TABLET | Refills: 0 | Status: SHIPPED | OUTPATIENT
Start: 2023-05-28 | End: 2023-06-07

## 2023-05-28 ASSESSMENT — PAIN DESCRIPTION - LOCATION: LOCATION: CHEST

## 2023-05-28 ASSESSMENT — PAIN SCALES - GENERAL: PAINLEVEL_OUTOF10: 7

## 2023-05-28 ASSESSMENT — PAIN - FUNCTIONAL ASSESSMENT: PAIN_FUNCTIONAL_ASSESSMENT: 0-10

## 2023-06-05 ENCOUNTER — HOSPITAL ENCOUNTER (OUTPATIENT)
Dept: CT IMAGING | Age: 48
Discharge: HOME | End: 2023-06-07
Payer: MEDICARE

## 2023-06-05 DIAGNOSIS — R91.1 LUNG NODULE: ICD-10-CM

## 2023-06-05 DIAGNOSIS — R93.5 ABNORMAL FINDINGS ON DIAGNOSTIC IMAGING OF OTHER ABDOMINAL REGIONS, INCLUDING RETROPERITONEUM: ICD-10-CM

## 2023-06-05 DIAGNOSIS — E27.8 ADRENAL NODULE (HCC): ICD-10-CM

## 2023-06-05 PROCEDURE — 74150 CT ABDOMEN W/O CONTRAST: CPT

## 2023-06-05 PROCEDURE — 71250 CT THORAX DX C-: CPT

## 2023-06-12 PROBLEM — E66.01 SEVERE OBESITY (BMI 35.0-39.9) WITH COMORBIDITY (HCC): Status: ACTIVE | Noted: 2023-06-12

## 2023-06-27 ENCOUNTER — TELEPHONE (OUTPATIENT)
Dept: FAMILY MEDICINE CLINIC | Facility: CLINIC | Age: 48
End: 2023-06-27

## 2023-06-27 PROBLEM — R73.03 PREDIABETES: Status: ACTIVE | Noted: 2023-06-27

## 2023-07-17 ASSESSMENT — PATIENT HEALTH QUESTIONNAIRE - PHQ9
6. FEELING BAD ABOUT YOURSELF - OR THAT YOU ARE A FAILURE OR HAVE LET YOURSELF OR YOUR FAMILY DOWN: 1
10. IF YOU CHECKED OFF ANY PROBLEMS, HOW DIFFICULT HAVE THESE PROBLEMS MADE IT FOR YOU TO DO YOUR WORK, TAKE CARE OF THINGS AT HOME, OR GET ALONG WITH OTHER PEOPLE: EXTREMELY DIFFICULT
3. TROUBLE FALLING OR STAYING ASLEEP: SEVERAL DAYS
SUM OF ALL RESPONSES TO PHQ QUESTIONS 1-9: 7
8. MOVING OR SPEAKING SO SLOWLY THAT OTHER PEOPLE COULD HAVE NOTICED. OR THE OPPOSITE - BEING SO FIDGETY OR RESTLESS THAT YOU HAVE BEEN MOVING AROUND A LOT MORE THAN USUAL: SEVERAL DAYS
7. TROUBLE CONCENTRATING ON THINGS, SUCH AS READING THE NEWSPAPER OR WATCHING TELEVISION: 1
8. MOVING OR SPEAKING SO SLOWLY THAT OTHER PEOPLE COULD HAVE NOTICED. OR THE OPPOSITE, BEING SO FIGETY OR RESTLESS THAT YOU HAVE BEEN MOVING AROUND A LOT MORE THAN USUAL: 1
SUM OF ALL RESPONSES TO PHQ QUESTIONS 1-9: 7
5. POOR APPETITE OR OVEREATING: 0
6. FEELING BAD ABOUT YOURSELF - OR THAT YOU ARE A FAILURE OR HAVE LET YOURSELF OR YOUR FAMILY DOWN: SEVERAL DAYS
4. FEELING TIRED OR HAVING LITTLE ENERGY: SEVERAL DAYS
SUM OF ALL RESPONSES TO PHQ QUESTIONS 1-9: 7
SUM OF ALL RESPONSES TO PHQ QUESTIONS 1-9: 7
1. LITTLE INTEREST OR PLEASURE IN DOING THINGS: 1
7. TROUBLE CONCENTRATING ON THINGS, SUCH AS READING THE NEWSPAPER OR WATCHING TELEVISION: SEVERAL DAYS
9. THOUGHTS THAT YOU WOULD BE BETTER OFF DEAD, OR OF HURTING YOURSELF: 0
5. POOR APPETITE OR OVEREATING: NOT AT ALL
3. TROUBLE FALLING OR STAYING ASLEEP: 1
9. THOUGHTS THAT YOU WOULD BE BETTER OFF DEAD, OR OF HURTING YOURSELF: NOT AT ALL
2. FEELING DOWN, DEPRESSED OR HOPELESS: SEVERAL DAYS
SUM OF ALL RESPONSES TO PHQ QUESTIONS 1-9: 7
1. LITTLE INTEREST OR PLEASURE IN DOING THINGS: SEVERAL DAYS
SUM OF ALL RESPONSES TO PHQ9 QUESTIONS 1 & 2: 2
2. FEELING DOWN, DEPRESSED OR HOPELESS: 1
4. FEELING TIRED OR HAVING LITTLE ENERGY: 1
10. IF YOU CHECKED OFF ANY PROBLEMS, HOW DIFFICULT HAVE THESE PROBLEMS MADE IT FOR YOU TO DO YOUR WORK, TAKE CARE OF THINGS AT HOME, OR GET ALONG WITH OTHER PEOPLE: 3

## 2023-07-20 ENCOUNTER — TELEMEDICINE (OUTPATIENT)
Dept: FAMILY MEDICINE CLINIC | Facility: CLINIC | Age: 48
End: 2023-07-20
Payer: MEDICARE

## 2023-07-20 DIAGNOSIS — E66.01 SEVERE OBESITY (BMI 35.0-39.9) WITH COMORBIDITY (HCC): Primary | ICD-10-CM

## 2023-07-20 DIAGNOSIS — K21.9 GASTROESOPHAGEAL REFLUX DISEASE WITHOUT ESOPHAGITIS: ICD-10-CM

## 2023-07-20 DIAGNOSIS — E55.9 VITAMIN D DEFICIENCY: ICD-10-CM

## 2023-07-20 DIAGNOSIS — R73.03 PREDIABETES: ICD-10-CM

## 2023-07-20 DIAGNOSIS — E78.5 DYSLIPIDEMIA: ICD-10-CM

## 2023-07-20 DIAGNOSIS — M25.50 ARTHRALGIA, UNSPECIFIED JOINT: ICD-10-CM

## 2023-07-20 PROCEDURE — 99214 OFFICE O/P EST MOD 30 MIN: CPT | Performed by: NURSE PRACTITIONER

## 2023-07-20 PROCEDURE — G8427 DOCREV CUR MEDS BY ELIG CLIN: HCPCS | Performed by: NURSE PRACTITIONER

## 2023-07-20 ASSESSMENT — ENCOUNTER SYMPTOMS
CHOKING: 0
BLOOD IN STOOL: 0
EYES NEGATIVE: 1
EYE ITCHING: 0
ALLERGIC/IMMUNOLOGIC NEGATIVE: 1
SHORTNESS OF BREATH: 0
VOICE CHANGE: 0
STRIDOR: 0
COLOR CHANGE: 0
GASTROINTESTINAL NEGATIVE: 1
PHOTOPHOBIA: 0
ANAL BLEEDING: 0
WHEEZING: 0
BACK PAIN: 0
FACIAL SWELLING: 0
EYE PAIN: 0
CONSTIPATION: 0
RESPIRATORY NEGATIVE: 1
EYE REDNESS: 0
TROUBLE SWALLOWING: 0
NAUSEA: 0
CHEST TIGHTNESS: 0
RHINORRHEA: 0
APNEA: 0
SINUS PRESSURE: 0
DIARRHEA: 0
RECTAL PAIN: 0
COUGH: 0
VOMITING: 0
SORE THROAT: 0
EYE DISCHARGE: 0
SINUS PAIN: 0
ABDOMINAL DISTENTION: 0
ABDOMINAL PAIN: 0

## 2023-07-20 NOTE — PROGRESS NOTES
Negative for adenopathy. Does not bruise/bleed easily. Psychiatric/Behavioral: Negative. Negative for agitation, behavioral problems, confusion, decreased concentration, dysphoric mood (stable on meds), hallucinations, self-injury, sleep disturbance (stable on meds) and suicidal ideas. The patient is not nervous/anxious (stable on meds) and is not hyperactive. OBJECTIVE:    Patient's vital signs were not performed as encounter was performed virtually. Location of virtual visit was patient's home. Physical Exam  Constitutional:       General: She is not in acute distress. Appearance: Normal appearance. She is not toxic-appearing or diaphoretic. HENT:      Head: Normocephalic and atraumatic. Neurological:      General: No focal deficit present. Mental Status: She is alert and oriented to person, place, and time. Psychiatric:         Mood and Affect: Mood normal.         Behavior: Behavior normal.         Thought Content: Thought content normal.         Judgment: Judgment normal.        Medical problems and test results were reviewed with the patient today.      Lab Results   Component Value Date     06/12/2023    K 3.5 06/12/2023     06/12/2023    CO2 22 06/12/2023    BUN 9 06/12/2023    CREATININE 1.00 06/12/2023    GLUCOSE 97 06/12/2023    CALCIUM 8.8 06/12/2023    PROT 6.9 06/12/2023    LABALBU 3.7 06/12/2023    BILITOT 0.2 06/12/2023    ALKPHOS 83 06/12/2023    AST 25 06/12/2023    ALT 80 (H) 06/12/2023    LABGLOM >60 06/12/2023    GFRAA >60 09/09/2022    AGRATIO 1.8 09/30/2021    GLOB 3.2 06/12/2023       Lab Results   Component Value Date    WBC 9.0 06/12/2023    HGB 13.1 06/12/2023    HCT 41.4 06/12/2023    MCV 93.0 06/12/2023     06/12/2023     Lab Results   Component Value Date    CHOL 207 (H) 06/12/2023    CHOL 203 (H) 12/06/2022    CHOL 186 09/09/2022     Lab Results   Component Value Date    TRIG 176 (H) 06/12/2023    TRIG 88 12/06/2022    TRIG 119 09/09/2022

## 2023-07-28 RX ORDER — CELECOXIB 100 MG/1
100 CAPSULE ORAL 2 TIMES DAILY PRN
Qty: 60 CAPSULE | Refills: 5 | OUTPATIENT
Start: 2023-07-28

## 2023-09-14 ENCOUNTER — TELEPHONE (OUTPATIENT)
Dept: FAMILY MEDICINE CLINIC | Facility: CLINIC | Age: 48
End: 2023-09-14

## 2023-09-14 NOTE — TELEPHONE ENCOUNTER
----- Message from Belkis Ashley sent at 9/7/2023  2:30 PM EDT -----  Subject: Message to Provider    QUESTIONS  Information for Provider? Patient needs to get appointment in person for   bloodwork, cholesterol and ankle re- check Please call the patient.  ---------------------------------------------------------------------------  --------------  Johnathon Billings [facility]  [phone]; OK to leave message on voicemail  ---------------------------------------------------------------------------  --------------  SCRIPT ANSWERS  Relationship to Patient?  Self

## 2023-10-13 ENCOUNTER — OFFICE VISIT (OUTPATIENT)
Dept: FAMILY MEDICINE CLINIC | Facility: CLINIC | Age: 48
End: 2023-10-13

## 2023-10-13 ENCOUNTER — HOSPITAL ENCOUNTER (OUTPATIENT)
Dept: GENERAL RADIOLOGY | Age: 48
Discharge: HOME | End: 2023-10-16

## 2023-10-13 VITALS
HEIGHT: 66 IN | WEIGHT: 220 LBS | DIASTOLIC BLOOD PRESSURE: 78 MMHG | TEMPERATURE: 98.8 F | SYSTOLIC BLOOD PRESSURE: 118 MMHG | HEART RATE: 129 BPM | BODY MASS INDEX: 35.36 KG/M2 | OXYGEN SATURATION: 100 %

## 2023-10-13 DIAGNOSIS — R00.0 TACHYCARDIA: ICD-10-CM

## 2023-10-13 DIAGNOSIS — E04.9 GOITER: ICD-10-CM

## 2023-10-13 DIAGNOSIS — R50.9 FEELS FEVERISH: ICD-10-CM

## 2023-10-13 DIAGNOSIS — R19.7 DIARRHEA, UNSPECIFIED TYPE: ICD-10-CM

## 2023-10-13 DIAGNOSIS — R94.31 ABNORMAL EKG: ICD-10-CM

## 2023-10-13 DIAGNOSIS — R00.2 PALPITATION: ICD-10-CM

## 2023-10-13 DIAGNOSIS — R00.2 PALPITATION: Primary | ICD-10-CM

## 2023-10-13 LAB
APPEARANCE UR: NORMAL
BILIRUB UR QL: NORMAL
COLOR UR: NORMAL
GLUCOSE UR STRIP.AUTO-MCNC: NORMAL MG/DL
GRANS ABSOLUTE, POC: 5.3 K/UL
GRANULOCYTES %, POC: 66.7 %
HEMATOCRIT, POC: 43 %
HEMOGLOBIN, POC: 13.9 G/DL
HGB UR QL STRIP: NORMAL
KETONES UR QL STRIP.AUTO: NORMAL MG/DL
LEUKOCYTE ESTERASE UR QL STRIP.AUTO: NORMAL
LYMPHOCYTE %, POC: 26.2 %
LYMPHS ABSOLUTE, POC: 2.1 K/UL
MAGNESIUM SERPL-MCNC: 2.3 MG/DL (ref 1.8–2.4)
MCH, POC: 29.3 PG (ref 40–?)
MCHC, POC: 32.3
MCV, POC: 90.8
MONOCYTE %, POC: 7.1 %
MONOCYTE, ABSOLUTE POC: 0.6 K/UL
MPV, POC: 7.6 FL
NITRITE UR QL STRIP.AUTO: NORMAL
PH UR STRIP: NORMAL [PH]
PLATELET COUNT, POC: 290 K/UL
PROT UR STRIP-MCNC: NORMAL MG/DL
RBC, POC: 4.74 M/UL
RDW, POC: 13.7 %
SP GR UR REFRACTOMETRY: NORMAL
T4 FREE SERPL-MCNC: 1.2 NG/DL (ref 0.78–1.46)
UROBILINOGEN UR QL STRIP.AUTO: NORMAL EU/DL
WBC, POC: 8 K/UL

## 2023-10-13 RX ORDER — CITALOPRAM 40 MG/1
40 TABLET ORAL DAILY
COMMUNITY
Start: 2023-09-11

## 2023-10-13 RX ORDER — DICYCLOMINE HCL 20 MG
20 TABLET ORAL 3 TIMES DAILY PRN
Qty: 30 TABLET | Refills: 0 | Status: SHIPPED | OUTPATIENT
Start: 2023-10-13

## 2023-10-13 NOTE — PROGRESS NOTES
adenopathy. Skin:     General: Skin is warm and dry. Capillary Refill: Capillary refill takes less than 2 seconds. Neurological:      General: No focal deficit present. Mental Status: She is alert and oriented to person, place, and time. Cranial Nerves: No cranial nerve deficit. Sensory: No sensory deficit. Motor: No weakness. Coordination: Coordination normal.      Gait: Gait normal.      Deep Tendon Reflexes: Reflexes normal.   Psychiatric:         Mood and Affect: Mood normal.         Behavior: Behavior normal.         Thought Content: Thought content normal.         Judgment: Judgment normal.       Medical problems and test results were reviewed with the patient today. 12 Lead ECG     EKG: sinus tachycardia, rate 121, nonspecific ST waves changes, short CO at 114, normal QT, normal axis, unchanged in comparison to EKG completed on 9/2022. No acute ischemic changes noted.       AMBER Langston - CNP        Lab Results   Component Value Date    TSH 0.575 07/29/2021    TSHELE 0.49 06/12/2023    MAC8NVL 0.888 09/09/2022       Lab Results   Component Value Date     06/12/2023    K 3.5 06/12/2023     06/12/2023    CO2 22 06/12/2023    BUN 9 06/12/2023    CREATININE 1.00 06/12/2023    GLUCOSE 97 06/12/2023    CALCIUM 8.8 06/12/2023    PROT 6.9 06/12/2023    LABALBU 3.7 06/12/2023    BILITOT 0.2 06/12/2023    ALKPHOS 83 06/12/2023    AST 25 06/12/2023    ALT 80 (H) 06/12/2023    LABGLOM >60 06/12/2023    GFRAA >60 09/09/2022    AGRATIO 1.8 09/30/2021    GLOB 3.2 06/12/2023       Hemoglobin A1C   Date Value Ref Range Status   06/12/2023 5.5 4.8 - 5.6 % Final     CT ABDOMEN WO CONTRAST Additional Contrast? None [[id number]]  Status: Final result     Order Providers    Authorizing Encounter Billing   Ángela Smith MD SFS CT 1 Ben Lamb MD            Signed by    Signed Date/Time Phone Pager   [phone] [facility], [address] [facility] 6/05/2023  [phone]

## 2023-10-15 LAB — T3FREE SERPL-MCNC: 4.1 PG/ML (ref 2–4.4)

## 2023-11-09 ENCOUNTER — TELEPHONE (OUTPATIENT)
Dept: FAMILY MEDICINE CLINIC | Facility: CLINIC | Age: 48
End: 2023-11-09

## 2023-11-09 NOTE — TELEPHONE ENCOUNTER
----- Message from Rosa Ash sent at 11/9/2023  1:30 PM EST -----  Subject: Message to Provider    QUESTIONS  Information for Provider? Patient is scheduled for a f/u appt w/ KEM Bhatti   on 11/21/23 by Virtual Visit. The only appts available in the near term   are Virtual Visits. Patient strongly prefers an IN PERSON visit since   she's got many tests and specialists that she's review findings. Please   let the patient know if this appt can be changed to in person or   rescheduled for an in person appt.  ---------------------------------------------------------------------------  --------------  Nima Ocala Ashleigh  [phone]; OK to leave message on voicemail,OK to respond with electronic   message via [employer] portal (only for patients who have registered [employer]   account)  ---------------------------------------------------------------------------  --------------  SCRIPT ANSWERS  Relationship to Patient?  Self

## 2023-11-16 ENCOUNTER — INITIAL CONSULT (OUTPATIENT)
Age: 48
End: 2023-11-16
Payer: MEDICARE

## 2023-11-16 ENCOUNTER — HOSPITAL ENCOUNTER (OUTPATIENT)
Dept: ULTRASOUND IMAGING | Age: 48
Discharge: HOME | End: 2023-11-19

## 2023-11-16 VITALS
SYSTOLIC BLOOD PRESSURE: 102 MMHG | DIASTOLIC BLOOD PRESSURE: 70 MMHG | HEIGHT: 66 IN | BODY MASS INDEX: 35.52 KG/M2 | WEIGHT: 221 LBS | HEART RATE: 111 BPM

## 2023-11-16 DIAGNOSIS — R73.03 PREDIABETES: ICD-10-CM

## 2023-11-16 DIAGNOSIS — E04.9 GOITER: ICD-10-CM

## 2023-11-16 DIAGNOSIS — R07.2 SUBSTERNAL PRECORDIAL CHEST PAIN: ICD-10-CM

## 2023-11-16 DIAGNOSIS — E66.01 SEVERE OBESITY (BMI 35.0-39.9) WITH COMORBIDITY (HCC): ICD-10-CM

## 2023-11-16 DIAGNOSIS — R00.0 SINUS TACHYCARDIA: ICD-10-CM

## 2023-11-16 DIAGNOSIS — F41.1 ANXIETY STATE: ICD-10-CM

## 2023-11-16 DIAGNOSIS — R00.2 HEART PALPITATIONS: Primary | ICD-10-CM

## 2023-11-16 PROCEDURE — 93000 ELECTROCARDIOGRAM COMPLETE: CPT | Performed by: INTERNAL MEDICINE

## 2023-11-16 PROCEDURE — 1036F TOBACCO NON-USER: CPT | Performed by: INTERNAL MEDICINE

## 2023-11-16 PROCEDURE — G8427 DOCREV CUR MEDS BY ELIG CLIN: HCPCS | Performed by: INTERNAL MEDICINE

## 2023-11-16 PROCEDURE — 99204 OFFICE O/P NEW MOD 45 MIN: CPT | Performed by: INTERNAL MEDICINE

## 2023-11-16 PROCEDURE — G8484 FLU IMMUNIZE NO ADMIN: HCPCS | Performed by: INTERNAL MEDICINE

## 2023-11-16 PROCEDURE — G8417 CALC BMI ABV UP PARAM F/U: HCPCS | Performed by: INTERNAL MEDICINE

## 2023-11-16 ASSESSMENT — ENCOUNTER SYMPTOMS
COUGH: 0
ALLERGIC/IMMUNOLOGIC NEGATIVE: 1
EYES NEGATIVE: 1
ORTHOPNEA: 0
GASTROINTESTINAL NEGATIVE: 1
SHORTNESS OF BREATH: 0
WHEEZING: 0

## 2023-11-16 NOTE — PROGRESS NOTES
Clinic Performed (Completed)    Severe obesity (BMI 35.0-39. 9) with comorbidity ([address])    Relevant Orders    EKG 12 Lead - Clinic Performed (Completed)    Prediabetes    Relevant Orders    EKG 12 Lead - Clinic Performed (Completed)     Other Visit Diagnoses       Heart palpitations    -  Primary    Relevant Orders    EKG 12 Lead - Clinic Performed (Completed)    Echo (TTE) complete (PRN contrast/bubble/strain/3D)    Extended cardiac holter monitor (48 hrs - 15 days)    Sinus tachycardia        Relevant Orders    Echo (TTE) complete (PRN contrast/bubble/strain/3D)    Extended cardiac holter monitor (48 hrs - 15 days)    Metanephrines Plasma Free    Substernal precordial chest pain        Relevant Orders    Nuclear stress test with myocardial perfusion    Echo (TTE) complete (PRN contrast/bubble/strain/3D)              Instructed patient go to ER or call 911/EMS should symptoms recur or worsen. Patient has been instructed and agrees to call our office with any issues or other concerns related to their cardiac condition(s) and/or complaint(s). Return in about 4 months (around 3/16/2024).     Juliocesar Live DO  11/16/2023 11:06 AM

## 2023-11-21 ENCOUNTER — TELEMEDICINE (OUTPATIENT)
Dept: FAMILY MEDICINE CLINIC | Facility: CLINIC | Age: 48
End: 2023-11-21
Payer: MEDICARE

## 2023-11-21 DIAGNOSIS — R79.89 ELEVATED LFTS: ICD-10-CM

## 2023-11-21 DIAGNOSIS — J01.90 ACUTE BACTERIAL SINUSITIS: Primary | ICD-10-CM

## 2023-11-21 DIAGNOSIS — R00.0 TACHYCARDIA: ICD-10-CM

## 2023-11-21 DIAGNOSIS — B96.89 ACUTE BACTERIAL SINUSITIS: Primary | ICD-10-CM

## 2023-11-21 DIAGNOSIS — R00.2 PALPITATION: ICD-10-CM

## 2023-11-21 PROCEDURE — 99214 OFFICE O/P EST MOD 30 MIN: CPT | Performed by: NURSE PRACTITIONER

## 2023-11-21 PROCEDURE — G8427 DOCREV CUR MEDS BY ELIG CLIN: HCPCS | Performed by: NURSE PRACTITIONER

## 2023-11-21 RX ORDER — DOXYCYCLINE 100 MG/1
100 TABLET ORAL 2 TIMES DAILY
Qty: 20 TABLET | Refills: 0 | Status: SHIPPED | OUTPATIENT
Start: 2023-11-21 | End: 2023-12-01

## 2023-11-21 RX ORDER — FLUCONAZOLE 150 MG/1
TABLET ORAL
Qty: 1 TABLET | Refills: 1 | Status: SHIPPED | OUTPATIENT
Start: 2023-11-21

## 2023-11-21 ASSESSMENT — ENCOUNTER SYMPTOMS
WHEEZING: 0
SINUS PAIN: 1
SHORTNESS OF BREATH: 0
COUGH: 1
ABDOMINAL PAIN: 0
ABDOMINAL DISTENTION: 0
EYE PAIN: 0
EYE REDNESS: 0
RHINORRHEA: 0
COLOR CHANGE: 0
SINUS PRESSURE: 1
PHOTOPHOBIA: 0
TROUBLE SWALLOWING: 0
CHEST TIGHTNESS: 1
BACK PAIN: 0
APNEA: 0
STRIDOR: 0
EYE DISCHARGE: 0
ANAL BLEEDING: 0
ALLERGIC/IMMUNOLOGIC NEGATIVE: 1
CHOKING: 0
RECTAL PAIN: 0
CONSTIPATION: 0
SORE THROAT: 0
NAUSEA: 0
FACIAL SWELLING: 0
VOICE CHANGE: 0
VOMITING: 0
DIARRHEA: 0
EYES NEGATIVE: 1
BLOOD IN STOOL: 0
GASTROINTESTINAL NEGATIVE: 1
EYE ITCHING: 0

## 2023-11-29 ENCOUNTER — APPOINTMENT (OUTPATIENT)
Dept: GENERAL RADIOLOGY | Age: 48
End: 2023-11-29
Payer: MEDICARE

## 2023-11-29 ENCOUNTER — HOSPITAL ENCOUNTER (EMERGENCY)
Age: 48
Discharge: HOME | End: 2023-11-29
Attending: EMERGENCY MEDICINE
Payer: MEDICARE

## 2023-11-29 ENCOUNTER — APPOINTMENT (OUTPATIENT)
Dept: CT IMAGING | Age: 48
End: 2023-11-29
Payer: MEDICARE

## 2023-11-29 VITALS
OXYGEN SATURATION: 95 % | RESPIRATION RATE: 17 BRPM | HEIGHT: 66 IN | TEMPERATURE: 98 F | DIASTOLIC BLOOD PRESSURE: 71 MMHG | SYSTOLIC BLOOD PRESSURE: 101 MMHG | BODY MASS INDEX: 33.75 KG/M2 | HEART RATE: 84 BPM | WEIGHT: 210 LBS

## 2023-11-29 DIAGNOSIS — K80.20 CALCULUS OF GALLBLADDER WITHOUT CHOLECYSTITIS WITHOUT OBSTRUCTION: Primary | ICD-10-CM

## 2023-11-29 DIAGNOSIS — D35.02 ADENOMA OF LEFT ADRENAL GLAND: ICD-10-CM

## 2023-11-29 DIAGNOSIS — R07.9 CHEST PAIN, UNSPECIFIED TYPE: ICD-10-CM

## 2023-11-29 LAB
ANION GAP SERPL CALC-SCNC: 0 MMOL/L (ref 2–11)
BASOPHILS # BLD: 0.1 K/UL (ref 0–0.2)
BASOPHILS NFR BLD: 1 % (ref 0–2)
BUN SERPL-MCNC: 7 MG/DL (ref 6–23)
CALCIUM SERPL-MCNC: 9.9 MG/DL (ref 8.3–10.4)
CHLORIDE SERPL-SCNC: 109 MMOL/L (ref 101–110)
CO2 SERPL-SCNC: 26 MMOL/L (ref 21–32)
CREAT SERPL-MCNC: 1.1 MG/DL (ref 0.6–1)
DIFFERENTIAL METHOD BLD: NORMAL
EOSINOPHIL # BLD: 0.2 K/UL (ref 0–0.8)
EOSINOPHIL NFR BLD: 2 % (ref 0.5–7.8)
ERYTHROCYTE [DISTWIDTH] IN BLOOD BY AUTOMATED COUNT: 13.5 % (ref 11.9–14.6)
GLUCOSE SERPL-MCNC: 133 MG/DL (ref 65–100)
HCT VFR BLD AUTO: 43.1 % (ref 35.8–46.3)
HGB BLD-MCNC: 13.8 G/DL (ref 11.7–15.4)
IMM GRANULOCYTES # BLD AUTO: 0 K/UL (ref 0–0.5)
IMM GRANULOCYTES NFR BLD AUTO: 0 % (ref 0–5)
LYMPHOCYTES # BLD: 2.4 K/UL (ref 0.5–4.6)
LYMPHOCYTES NFR BLD: 25 % (ref 13–44)
MCH RBC QN AUTO: 28.2 PG (ref 26.1–32.9)
MCHC RBC AUTO-ENTMCNC: 32 G/DL (ref 31.4–35)
MCV RBC AUTO: 88 FL (ref 82–102)
MONOCYTES # BLD: 0.6 K/UL (ref 0.1–1.3)
MONOCYTES NFR BLD: 6 % (ref 4–12)
NEUTS SEG # BLD: 6.4 K/UL (ref 1.7–8.2)
NEUTS SEG NFR BLD: 66 % (ref 43–78)
NRBC # BLD: 0 K/UL (ref 0–0.2)
PLATELET # BLD AUTO: 338 K/UL (ref 150–450)
PMV BLD AUTO: 9.6 FL (ref 9.4–12.3)
POTASSIUM SERPL-SCNC: 3.6 MMOL/L (ref 3.5–5.1)
RBC # BLD AUTO: 4.9 M/UL (ref 4.05–5.2)
SODIUM SERPL-SCNC: 135 MMOL/L (ref 133–143)
TROPONIN I SERPL HS-MCNC: <3 PG/ML (ref 0–14)
WBC # BLD AUTO: 9.7 K/UL (ref 4.3–11.1)

## 2023-11-29 PROCEDURE — 70450 CT HEAD/BRAIN W/O DYE: CPT

## 2023-11-29 PROCEDURE — 71260 CT THORAX DX C+: CPT

## 2023-11-29 PROCEDURE — 99285 EMERGENCY DEPT VISIT HI MDM: CPT

## 2023-11-29 PROCEDURE — 6360000004 HC RX CONTRAST MEDICATION: Performed by: EMERGENCY MEDICINE

## 2023-11-29 PROCEDURE — 71046 X-RAY EXAM CHEST 2 VIEWS: CPT

## 2023-11-29 PROCEDURE — 74177 CT ABD & PELVIS W/CONTRAST: CPT

## 2023-11-29 PROCEDURE — 85025 COMPLETE CBC W/AUTO DIFF WBC: CPT

## 2023-11-29 PROCEDURE — 80048 BASIC METABOLIC PNL TOTAL CA: CPT

## 2023-11-29 PROCEDURE — 93005 ELECTROCARDIOGRAM TRACING: CPT | Performed by: EMERGENCY MEDICINE

## 2023-11-29 PROCEDURE — 84484 ASSAY OF TROPONIN QUANT: CPT

## 2023-11-29 RX ORDER — 0.9 % SODIUM CHLORIDE 0.9 %
100 INTRAVENOUS SOLUTION INTRAVENOUS ONCE
Status: DISCONTINUED | OUTPATIENT
Start: 2023-11-29 | End: 2023-11-29 | Stop reason: HOSPADM

## 2023-11-29 RX ORDER — HYDROCODONE BITARTRATE AND ACETAMINOPHEN 5; 325 MG/1; MG/1
1 TABLET ORAL EVERY 8 HOURS PRN
Qty: 12 TABLET | Refills: 0 | Status: SHIPPED | OUTPATIENT
Start: 2023-11-29 | End: 2023-12-04

## 2023-11-29 RX ORDER — SODIUM CHLORIDE 0.9 % (FLUSH) 0.9 %
10 SYRINGE (ML) INJECTION
Status: DISCONTINUED | OUTPATIENT
Start: 2023-11-29 | End: 2023-11-29 | Stop reason: HOSPADM

## 2023-11-29 RX ORDER — ONDANSETRON 4 MG/1
4 TABLET, ORALLY DISINTEGRATING ORAL 3 TIMES DAILY PRN
Qty: 21 TABLET | Refills: 0 | Status: SHIPPED | OUTPATIENT
Start: 2023-11-29

## 2023-11-29 RX ADMIN — IOPAMIDOL 100 ML: 755 INJECTION, SOLUTION INTRAVENOUS at 19:34

## 2023-11-29 ASSESSMENT — PAIN DESCRIPTION - ORIENTATION: ORIENTATION: LEFT;UPPER

## 2023-11-29 ASSESSMENT — LIFESTYLE VARIABLES
HOW OFTEN DO YOU HAVE A DRINK CONTAINING ALCOHOL: NEVER
HOW MANY STANDARD DRINKS CONTAINING ALCOHOL DO YOU HAVE ON A TYPICAL DAY: PATIENT DOES NOT DRINK

## 2023-11-29 ASSESSMENT — ENCOUNTER SYMPTOMS
SHORTNESS OF BREATH: 1
ABDOMINAL PAIN: 1
BACK PAIN: 0

## 2023-11-29 ASSESSMENT — PAIN DESCRIPTION - DESCRIPTORS: DESCRIPTORS: DULL

## 2023-11-29 ASSESSMENT — PAIN - FUNCTIONAL ASSESSMENT: PAIN_FUNCTIONAL_ASSESSMENT: 0-10

## 2023-11-29 ASSESSMENT — PAIN DESCRIPTION - LOCATION: LOCATION: CHEST

## 2023-11-29 ASSESSMENT — PAIN SCALES - GENERAL: PAINLEVEL_OUTOF10: 6

## 2023-11-29 NOTE — ED PROVIDER NOTES
follow-up CT thorax without contrast is    recommended in 6 months time to ensure stability. 5. Very small hiatal hernia. Edilson Chery M.D.    11/29/2023 8:12:00 PM      XR CHEST (2 VW)   Final Result   1. There is no acute cardiopulmonary process. Jenna Roa M.D.    11/29/2023 5:53:00 PM                        Voice dictation software was used during the making of this note. This software is not perfect and grammatical and other typographical errors may be present. This note has not been completely proofread for errors.      Diomedes Mendoza MD  11/29/23 2112

## 2023-11-29 NOTE — ED TRIAGE NOTES
Pt arrives to ed via pov with chest pain and fatigue as well as sharp pain in limbs. Chest pain x multiple months but worse today. Pt sees cards and is wearing a monitor. PCP dx of sinus infections x 1 week and on doxycyline. Denies n/v and radiating pain.

## 2023-11-30 LAB
EKG ATRIAL RATE: 79 BPM
EKG DIAGNOSIS: NORMAL
EKG P AXIS: 43 DEGREES
EKG P-R INTERVAL: 136 MS
EKG Q-T INTERVAL: 380 MS
EKG QRS DURATION: 88 MS
EKG QTC CALCULATION (BAZETT): 435 MS
EKG R AXIS: 19 DEGREES
EKG T AXIS: 31 DEGREES
EKG VENTRICULAR RATE: 79 BPM

## 2023-11-30 PROCEDURE — 93010 ELECTROCARDIOGRAM REPORT: CPT | Performed by: INTERNAL MEDICINE

## 2023-11-30 NOTE — DISCHARGE INSTRUCTIONS
Take Zofran (as needed for nausea) and hydrocodone (as needed pain). Follow-up with general surgery. Return for worsening symptoms or any emergency.   Continue to follow-up with your cardiologist.

## 2023-12-01 ENCOUNTER — TELEPHONE (OUTPATIENT)
Dept: SURGERY | Age: 48
End: 2023-12-01

## 2023-12-01 NOTE — TELEPHONE ENCOUNTER
Called patient to schedule a ED F/U appointment with Dr. Kelechi Stiles on December 5th at 10:40am. Patient didn't answer so I left a voicemail.

## 2023-12-01 NOTE — TELEPHONE ENCOUNTER
Called patient to schedule a ED F/U appointment with Dr. Aravind Driscoll . Patient didn't answer so I left a voicemail.

## 2023-12-05 ENCOUNTER — OFFICE VISIT (OUTPATIENT)
Dept: SURGERY | Age: 48
End: 2023-12-05
Payer: MEDICARE

## 2023-12-05 VITALS
DIASTOLIC BLOOD PRESSURE: 61 MMHG | SYSTOLIC BLOOD PRESSURE: 90 MMHG | HEART RATE: 105 BPM | WEIGHT: 221.4 LBS | BODY MASS INDEX: 35.73 KG/M2

## 2023-12-05 DIAGNOSIS — R10.10 UPPER ABDOMINAL PAIN: Primary | ICD-10-CM

## 2023-12-05 DIAGNOSIS — K80.20 GALLSTONES: ICD-10-CM

## 2023-12-05 DIAGNOSIS — R93.5 ABNORMAL CT OF THE ABDOMEN: ICD-10-CM

## 2023-12-05 PROCEDURE — 1036F TOBACCO NON-USER: CPT | Performed by: SURGERY

## 2023-12-05 PROCEDURE — G8484 FLU IMMUNIZE NO ADMIN: HCPCS | Performed by: SURGERY

## 2023-12-05 PROCEDURE — G8427 DOCREV CUR MEDS BY ELIG CLIN: HCPCS | Performed by: SURGERY

## 2023-12-05 PROCEDURE — G8417 CALC BMI ABV UP PARAM F/U: HCPCS | Performed by: SURGERY

## 2023-12-05 PROCEDURE — 99205 OFFICE O/P NEW HI 60 MIN: CPT | Performed by: SURGERY

## 2023-12-05 ASSESSMENT — ENCOUNTER SYMPTOMS
EYE PAIN: 0
SHORTNESS OF BREATH: 1
CONSTIPATION: 1
ABDOMINAL PAIN: 1
BACK PAIN: 1
VOMITING: 1
COLOR CHANGE: 0
WHEEZING: 1
SINUS PAIN: 0
COUGH: 1
SINUS PRESSURE: 1
EYE ITCHING: 0
DIARRHEA: 1
EYE DISCHARGE: 0
NAUSEA: 1
SORE THROAT: 0

## 2023-12-11 ENCOUNTER — TELEPHONE (OUTPATIENT)
Age: 48
End: 2023-12-11

## 2023-12-11 NOTE — TELEPHONE ENCOUNTER
Patient having EGD w/dilation / Colonoscopy on 01/04/24 with Dr. Jose Graham. Requesting cardiac clearance and any medication hold.  Fax: [phone]

## 2023-12-11 NOTE — TELEPHONE ENCOUNTER
Patient currently scheduled to have ECHO and Nuclear Stress Test this month. Will address after appointments.

## 2023-12-13 RX ORDER — ATORVASTATIN CALCIUM 10 MG/1
TABLET, FILM COATED ORAL
Qty: 90 TABLET | Refills: 1 | Status: SHIPPED | OUTPATIENT
Start: 2023-12-13

## 2024-01-10 ENCOUNTER — HOSPITAL ENCOUNTER (OUTPATIENT)
Dept: NUCLEAR MEDICINE | Age: 49
Discharge: HOME | End: 2024-01-13
Attending: SURGERY
Payer: MEDICARE

## 2024-01-10 VITALS — BODY MASS INDEX: 35.51 KG/M2 | WEIGHT: 220 LBS

## 2024-01-10 DIAGNOSIS — R10.10 UPPER ABDOMINAL PAIN: ICD-10-CM

## 2024-01-10 PROCEDURE — 2580000003 HC RX 258: Performed by: SURGERY

## 2024-01-10 PROCEDURE — 3430000000 HC RX DIAGNOSTIC RADIOPHARMACEUTICAL: Performed by: SURGERY

## 2024-01-10 PROCEDURE — 78227 HEPATOBIL SYST IMAGE W/DRUG: CPT

## 2024-01-10 PROCEDURE — A9537 TC99M MEBROFENIN: HCPCS | Performed by: SURGERY

## 2024-01-10 RX ORDER — SODIUM CHLORIDE 0.9 % (FLUSH) 0.9 %
20 SYRINGE (ML) INJECTION AS NEEDED
Status: DISCONTINUED | OUTPATIENT
Start: 2024-01-10 | End: 2024-01-14 | Stop reason: HOSPADM

## 2024-01-10 RX ORDER — KIT FOR THE PREPARATION OF TECHNETIUM TC 99M MEBROFENIN 45 MG/10ML
6.3 INJECTION, POWDER, LYOPHILIZED, FOR SOLUTION INTRAVENOUS AS NEEDED
Status: DISCONTINUED | OUTPATIENT
Start: 2024-01-10 | End: 2024-01-14 | Stop reason: HOSPADM

## 2024-01-10 RX ADMIN — SODIUM CHLORIDE, PRESERVATIVE FREE 20 ML: 5 INJECTION INTRAVENOUS at 13:00

## 2024-01-10 RX ADMIN — MEBROFENIN 6.3 MILLICURIE: 45 INJECTION, POWDER, LYOPHILIZED, FOR SOLUTION INTRAVENOUS at 13:00

## 2024-01-11 ENCOUNTER — TELEPHONE (OUTPATIENT)
Dept: SURGERY | Age: 49
End: 2024-01-11

## 2024-01-11 RX ORDER — CIPROFLOXACIN 500 MG/1
500 TABLET, FILM COATED ORAL 2 TIMES DAILY
Qty: 20 TABLET | Refills: 0 | Status: SHIPPED | OUTPATIENT
Start: 2024-01-11 | End: 2024-01-21

## 2024-01-11 RX ORDER — METRONIDAZOLE 500 MG/1
500 TABLET ORAL 3 TIMES DAILY
Qty: 30 TABLET | Refills: 0 | Status: SHIPPED | OUTPATIENT
Start: 2024-01-11 | End: 2024-01-21

## 2024-01-11 NOTE — TELEPHONE ENCOUNTER
Per Dr. Menon this patient needs to be scheduled for a gallbladder surgery soon and also she has sent in some antibiotics for her to take in the meantime. I have left two messages today for her to call back and schedule.

## 2024-01-16 ENCOUNTER — PREP FOR PROCEDURE (OUTPATIENT)
Dept: SURGERY | Age: 49
End: 2024-01-16

## 2024-01-16 ENCOUNTER — TELEPHONE (OUTPATIENT)
Dept: SURGERY | Age: 49
End: 2024-01-16

## 2024-01-16 PROBLEM — K80.20 GALLSTONE: Status: ACTIVE | Noted: 2024-01-16

## 2024-01-16 RX ORDER — LEVOFLOXACIN 500 MG/1
500 TABLET, FILM COATED ORAL DAILY
Qty: 7 TABLET | Refills: 0 | Status: SHIPPED | OUTPATIENT
Start: 2024-01-16 | End: 2024-01-23

## 2024-01-16 NOTE — TELEPHONE ENCOUNTER
Patient called stating that there is a contraindication between the Cipro that you prescribed and the Celexa that patient already takes. Patient CAN take the Flagyl, but requesting an alternate antibiotic to replace the Cipro. Thank you.

## 2024-01-18 ENCOUNTER — PATIENT MESSAGE (OUTPATIENT)
Dept: SURGERY | Age: 49
End: 2024-01-18

## 2024-01-19 ENCOUNTER — TELEPHONE (OUTPATIENT)
Dept: FAMILY MEDICINE CLINIC | Facility: CLINIC | Age: 49
End: 2024-01-19

## 2024-01-19 RX ORDER — HYDROCODONE BITARTRATE AND ACETAMINOPHEN 5; 325 MG/1; MG/1
TABLET ORAL
COMMUNITY
Start: 2023-12-08

## 2024-01-19 RX ORDER — SODIUM FLUORIDE 5 MG/G
PASTE, DENTIFRICE ORAL
COMMUNITY
Start: 2024-01-13

## 2024-01-19 RX ORDER — DULAGLUTIDE 0.75 MG/.5ML
0.75 INJECTION, SOLUTION SUBCUTANEOUS WEEKLY
COMMUNITY

## 2024-01-19 RX ORDER — TOPIRAMATE 100 MG/1
100 TABLET, FILM COATED ORAL DAILY
COMMUNITY
Start: 2023-12-17

## 2024-01-19 RX ORDER — ESOMEPRAZOLE MAGNESIUM 40 MG/1
CAPSULE, DELAYED RELEASE ORAL
COMMUNITY
Start: 2023-12-17

## 2024-01-24 NOTE — PERIOP NOTE
Multiple messages (x3) left for patient to return call and complete PAT assessment.  No response from patient.

## 2024-01-24 NOTE — PERIOP NOTE
Patient verified name and .  Order for consent IS NOT found in EHR; patient verifies procedure.   Type 1b surgery, phone assessment complete.  Orders not received.  Labs per surgeon: none  Labs per anesthesia protocol: Hcg needed DOS.    Patient answered medical/surgical history questions at their best of ability. All prior to admission medications documented in EPIC.  Patient instructed to take the following medications the day of surgery according to anesthesia guidelines with a small sip of water: Celexa, Norethindrone, Nexium.   On the day before surgery please take 2 Tylenol in the morning and then again before bed. You may use either regular or extra strength.   Hold all vitamins 7 days prior to surgery and NSAIDS 5 days prior to surgery.   Patient instructed on the following:    > Arrive at [facility] \"A\" Entrance, time of arrival to be called the day before by 1700  > NPO after midnight, unless otherwise indicated, including gum, mints, and ice chips  > Responsible adult must drive patient to the hospital, stay during surgery, and patient will need supervision 24 hours after anesthesia  > Use non moisturizing soap in shower the night before surgery and on the morning of surgery  > All piercings must be removed prior to arrival.    > Leave all valuables (money and jewelry) at home but bring insurance card and ID on DOS.   > You may be required to pay a deductible or co-pay on the day of your procedure. You can pre-pay by calling [phone] if your surgery is at the [facility] or [phone] if your surgery is at the [facility].  > Do not wear make-up, nail polish, lotions, cologne, perfumes, powders, or oil on skin. Artificial nails are not permitted.

## 2024-01-25 ENCOUNTER — ANESTHESIA (OUTPATIENT)
Dept: SURGERY | Age: 49
End: 2024-01-25
Payer: MEDICARE

## 2024-01-25 ENCOUNTER — HOSPITAL ENCOUNTER (OUTPATIENT)
Age: 49
Setting detail: OUTPATIENT SURGERY
Discharge: HOME | End: 2024-01-25
Attending: SURGERY | Admitting: SURGERY
Payer: MEDICARE

## 2024-01-25 ENCOUNTER — ANESTHESIA EVENT (OUTPATIENT)
Dept: SURGERY | Age: 49
End: 2024-01-25
Payer: MEDICARE

## 2024-01-25 VITALS
RESPIRATION RATE: 18 BRPM | WEIGHT: 222.8 LBS | HEART RATE: 88 BPM | TEMPERATURE: 98.6 F | HEIGHT: 66 IN | DIASTOLIC BLOOD PRESSURE: 66 MMHG | SYSTOLIC BLOOD PRESSURE: 107 MMHG | OXYGEN SATURATION: 92 % | BODY MASS INDEX: 35.81 KG/M2

## 2024-01-25 DIAGNOSIS — K80.10 CALCULUS OF GALLBLADDER WITH CHRONIC CHOLECYSTITIS WITHOUT OBSTRUCTION: Primary | ICD-10-CM

## 2024-01-25 LAB — HCG UR QL: NEGATIVE

## 2024-01-25 PROCEDURE — 2500000003 HC RX 250 WO HCPCS: Performed by: NURSE ANESTHETIST, CERTIFIED REGISTERED

## 2024-01-25 PROCEDURE — 81025 URINE PREGNANCY TEST: CPT

## 2024-01-25 PROCEDURE — 88304 TISSUE EXAM BY PATHOLOGIST: CPT

## 2024-01-25 PROCEDURE — 7100000011 HC PHASE II RECOVERY - ADDTL 15 MIN: Performed by: SURGERY

## 2024-01-25 PROCEDURE — 7100000001 HC PACU RECOVERY - ADDTL 15 MIN: Performed by: SURGERY

## 2024-01-25 PROCEDURE — 7100000010 HC PHASE II RECOVERY - FIRST 15 MIN: Performed by: SURGERY

## 2024-01-25 PROCEDURE — S2900 ROBOTIC SURGICAL SYSTEM: HCPCS | Performed by: SURGERY

## 2024-01-25 PROCEDURE — 6360000002 HC RX W HCPCS: Performed by: NURSE ANESTHETIST, CERTIFIED REGISTERED

## 2024-01-25 PROCEDURE — 2720000010 HC SURG SUPPLY STERILE: Performed by: SURGERY

## 2024-01-25 PROCEDURE — 7100000000 HC PACU RECOVERY - FIRST 15 MIN: Performed by: SURGERY

## 2024-01-25 PROCEDURE — 2709999900 HC NON-CHARGEABLE SUPPLY: Performed by: SURGERY

## 2024-01-25 PROCEDURE — 2580000003 HC RX 258: Performed by: ANESTHESIOLOGY

## 2024-01-25 PROCEDURE — 6360000002 HC RX W HCPCS: Performed by: SURGERY

## 2024-01-25 PROCEDURE — 3600000019 HC SURGERY ROBOT ADDTL 15MIN: Performed by: SURGERY

## 2024-01-25 PROCEDURE — 6360000002 HC RX W HCPCS: Performed by: ANESTHESIOLOGY

## 2024-01-25 PROCEDURE — C1889 IMPLANT/INSERT DEVICE, NOC: HCPCS | Performed by: SURGERY

## 2024-01-25 PROCEDURE — 2500000003 HC RX 250 WO HCPCS: Performed by: SURGERY

## 2024-01-25 PROCEDURE — 3700000000 HC ANESTHESIA ATTENDED CARE: Performed by: SURGERY

## 2024-01-25 PROCEDURE — 3600000009 HC SURGERY ROBOT BASE: Performed by: SURGERY

## 2024-01-25 PROCEDURE — 6370000000 HC RX 637 (ALT 250 FOR IP): Performed by: ANESTHESIOLOGY

## 2024-01-25 PROCEDURE — 2580000003 HC RX 258: Performed by: NURSE ANESTHETIST, CERTIFIED REGISTERED

## 2024-01-25 PROCEDURE — 3700000001 HC ADD 15 MINUTES (ANESTHESIA): Performed by: SURGERY

## 2024-01-25 PROCEDURE — 47562 LAPAROSCOPIC CHOLECYSTECTOMY: CPT | Performed by: SURGERY

## 2024-01-25 DEVICE — CLIP INT M L POLYMER LOK LIG HEM O LOK: Type: IMPLANTABLE DEVICE | Site: ABDOMEN | Status: FUNCTIONAL

## 2024-01-25 RX ORDER — LABETALOL HYDROCHLORIDE 5 MG/ML
INJECTION, SOLUTION INTRAVENOUS PRN
Status: DISCONTINUED | OUTPATIENT
Start: 2024-01-25 | End: 2024-01-25 | Stop reason: SDUPTHER

## 2024-01-25 RX ORDER — PROPOFOL 10 MG/ML
INJECTION, EMULSION INTRAVENOUS PRN
Status: DISCONTINUED | OUTPATIENT
Start: 2024-01-25 | End: 2024-01-25 | Stop reason: SDUPTHER

## 2024-01-25 RX ORDER — FENTANYL CITRATE 50 UG/ML
INJECTION, SOLUTION INTRAMUSCULAR; INTRAVENOUS PRN
Status: DISCONTINUED | OUTPATIENT
Start: 2024-01-25 | End: 2024-01-25 | Stop reason: SDUPTHER

## 2024-01-25 RX ORDER — SODIUM CHLORIDE 0.9 % (FLUSH) 0.9 %
5-40 SYRINGE (ML) INJECTION EVERY 12 HOURS SCHEDULED
Status: DISCONTINUED | OUTPATIENT
Start: 2024-01-25 | End: 2024-01-25 | Stop reason: HOSPADM

## 2024-01-25 RX ORDER — SODIUM CHLORIDE 9 MG/ML
INJECTION, SOLUTION INTRAVENOUS PRN
Status: DISCONTINUED | OUTPATIENT
Start: 2024-01-25 | End: 2024-01-25 | Stop reason: HOSPADM

## 2024-01-25 RX ORDER — NEOSTIGMINE METHYLSULFATE 1 MG/ML
INJECTION, SOLUTION INTRAVENOUS PRN
Status: DISCONTINUED | OUTPATIENT
Start: 2024-01-25 | End: 2024-01-25 | Stop reason: SDUPTHER

## 2024-01-25 RX ORDER — SODIUM CHLORIDE 0.9 % (FLUSH) 0.9 %
5-40 SYRINGE (ML) INJECTION PRN
Status: DISCONTINUED | OUTPATIENT
Start: 2024-01-25 | End: 2024-01-25 | Stop reason: HOSPADM

## 2024-01-25 RX ORDER — OXYCODONE HYDROCHLORIDE 5 MG/1
5 TABLET ORAL
Status: DISCONTINUED | OUTPATIENT
Start: 2024-01-25 | End: 2024-01-25 | Stop reason: HOSPADM

## 2024-01-25 RX ORDER — GLYCOPYRROLATE 0.2 MG/ML
INJECTION INTRAMUSCULAR; INTRAVENOUS PRN
Status: DISCONTINUED | OUTPATIENT
Start: 2024-01-25 | End: 2024-01-25 | Stop reason: SDUPTHER

## 2024-01-25 RX ORDER — DEXAMETHASONE SODIUM PHOSPHATE 10 MG/ML
INJECTION INTRAMUSCULAR; INTRAVENOUS PRN
Status: DISCONTINUED | OUTPATIENT
Start: 2024-01-25 | End: 2024-01-25 | Stop reason: SDUPTHER

## 2024-01-25 RX ORDER — HYDROMORPHONE HYDROCHLORIDE 1 MG/ML
0.5 INJECTION, SOLUTION INTRAMUSCULAR; INTRAVENOUS; SUBCUTANEOUS EVERY 5 MIN PRN
Status: DISCONTINUED | OUTPATIENT
Start: 2024-01-25 | End: 2024-01-25 | Stop reason: HOSPADM

## 2024-01-25 RX ORDER — PROCHLORPERAZINE EDISYLATE 5 MG/ML
5 INJECTION INTRAMUSCULAR; INTRAVENOUS
Status: COMPLETED | OUTPATIENT
Start: 2024-01-25 | End: 2024-01-25

## 2024-01-25 RX ORDER — ONDANSETRON 2 MG/ML
4 INJECTION INTRAMUSCULAR; INTRAVENOUS
Status: DISCONTINUED | OUTPATIENT
Start: 2024-01-25 | End: 2024-01-25 | Stop reason: HOSPADM

## 2024-01-25 RX ORDER — SODIUM CHLORIDE, SODIUM LACTATE, POTASSIUM CHLORIDE, CALCIUM CHLORIDE 600; 310; 30; 20 MG/100ML; MG/100ML; MG/100ML; MG/100ML
INJECTION, SOLUTION INTRAVENOUS CONTINUOUS
Status: DISCONTINUED | OUTPATIENT
Start: 2024-01-25 | End: 2024-01-25 | Stop reason: HOSPADM

## 2024-01-25 RX ORDER — ROCURONIUM BROMIDE 10 MG/ML
INJECTION, SOLUTION INTRAVENOUS PRN
Status: DISCONTINUED | OUTPATIENT
Start: 2024-01-25 | End: 2024-01-25 | Stop reason: SDUPTHER

## 2024-01-25 RX ORDER — INDOCYANINE GREEN AND WATER 25 MG
5 KIT INJECTION ONCE
Status: COMPLETED | OUTPATIENT
Start: 2024-01-25 | End: 2024-01-25

## 2024-01-25 RX ORDER — OXYCODONE HYDROCHLORIDE AND ACETAMINOPHEN 5; 325 MG/1; MG/1
1 TABLET ORAL EVERY 6 HOURS PRN
Qty: 28 TABLET | Refills: 0 | Status: SHIPPED | OUTPATIENT
Start: 2024-01-25 | End: 2024-02-01

## 2024-01-25 RX ORDER — LIDOCAINE HYDROCHLORIDE 10 MG/ML
1 INJECTION, SOLUTION INFILTRATION; PERINEURAL
Status: DISCONTINUED | OUTPATIENT
Start: 2024-01-25 | End: 2024-01-25 | Stop reason: HOSPADM

## 2024-01-25 RX ORDER — ACETAMINOPHEN 500 MG
1000 TABLET ORAL ONCE
Status: COMPLETED | OUTPATIENT
Start: 2024-01-25 | End: 2024-01-25

## 2024-01-25 RX ORDER — BUPIVACAINE HYDROCHLORIDE 5 MG/ML
INJECTION, SOLUTION EPIDURAL; INTRACAUDAL PRN
Status: DISCONTINUED | OUTPATIENT
Start: 2024-01-25 | End: 2024-01-25 | Stop reason: ALTCHOICE

## 2024-01-25 RX ORDER — MEPERIDINE HYDROCHLORIDE 50 MG/ML
12.5 INJECTION INTRAMUSCULAR; INTRAVENOUS; SUBCUTANEOUS EVERY 5 MIN PRN
Status: DISCONTINUED | OUTPATIENT
Start: 2024-01-25 | End: 2024-01-25 | Stop reason: HOSPADM

## 2024-01-25 RX ORDER — LIDOCAINE HYDROCHLORIDE 20 MG/ML
INJECTION, SOLUTION EPIDURAL; INFILTRATION; INTRACAUDAL; PERINEURAL PRN
Status: DISCONTINUED | OUTPATIENT
Start: 2024-01-25 | End: 2024-01-25 | Stop reason: SDUPTHER

## 2024-01-25 RX ORDER — ONDANSETRON 2 MG/ML
INJECTION INTRAMUSCULAR; INTRAVENOUS PRN
Status: DISCONTINUED | OUTPATIENT
Start: 2024-01-25 | End: 2024-01-25 | Stop reason: SDUPTHER

## 2024-01-25 RX ORDER — KETAMINE HCL IN NACL, ISO-OSM 20 MG/2 ML
SYRINGE (ML) INJECTION PRN
Status: DISCONTINUED | OUTPATIENT
Start: 2024-01-25 | End: 2024-01-25 | Stop reason: SDUPTHER

## 2024-01-25 RX ORDER — MIDAZOLAM HYDROCHLORIDE 1 MG/ML
INJECTION INTRAMUSCULAR; INTRAVENOUS PRN
Status: DISCONTINUED | OUTPATIENT
Start: 2024-01-25 | End: 2024-01-25 | Stop reason: SDUPTHER

## 2024-01-25 RX ORDER — SODIUM CHLORIDE, SODIUM LACTATE, POTASSIUM CHLORIDE, CALCIUM CHLORIDE 600; 310; 30; 20 MG/100ML; MG/100ML; MG/100ML; MG/100ML
INJECTION, SOLUTION INTRAVENOUS CONTINUOUS PRN
Status: DISCONTINUED | OUTPATIENT
Start: 2024-01-25 | End: 2024-01-25 | Stop reason: SDUPTHER

## 2024-01-25 RX ADMIN — Medication 40 MG: at 15:01

## 2024-01-25 RX ADMIN — SODIUM CHLORIDE, POTASSIUM CHLORIDE, SODIUM LACTATE AND CALCIUM CHLORIDE 125 ML/HR: 600; 310; 30; 20 INJECTION, SOLUTION INTRAVENOUS at 12:38

## 2024-01-25 RX ADMIN — INDOCYANINE GREEN AND WATER 5 MG: KIT at 12:45

## 2024-01-25 RX ADMIN — SODIUM CHLORIDE, SODIUM LACTATE, POTASSIUM CHLORIDE, AND CALCIUM CHLORIDE: 600; 310; 30; 20 INJECTION, SOLUTION INTRAVENOUS at 14:41

## 2024-01-25 RX ADMIN — PHENYLEPHRINE HYDROCHLORIDE 150 MCG: 0.1 INJECTION, SOLUTION INTRAVENOUS at 15:20

## 2024-01-25 RX ADMIN — PHENYLEPHRINE HYDROCHLORIDE 50 MCG: 0.1 INJECTION, SOLUTION INTRAVENOUS at 14:53

## 2024-01-25 RX ADMIN — PROPOFOL 200 MG: 10 INJECTION, EMULSION INTRAVENOUS at 14:53

## 2024-01-25 RX ADMIN — LABETALOL HYDROCHLORIDE 10 MG: 5 INJECTION INTRAVENOUS at 15:42

## 2024-01-25 RX ADMIN — GLYCOPYRROLATE 0.4 MG: 0.2 INJECTION INTRAMUSCULAR; INTRAVENOUS at 15:53

## 2024-01-25 RX ADMIN — MIDAZOLAM 2 MG: 1 INJECTION INTRAMUSCULAR; INTRAVENOUS at 14:43

## 2024-01-25 RX ADMIN — SODIUM CHLORIDE, SODIUM LACTATE, POTASSIUM CHLORIDE, AND CALCIUM CHLORIDE: 600; 310; 30; 20 INJECTION, SOLUTION INTRAVENOUS at 15:00

## 2024-01-25 RX ADMIN — FENTANYL CITRATE 100 MCG: 50 INJECTION, SOLUTION INTRAMUSCULAR; INTRAVENOUS at 14:52

## 2024-01-25 RX ADMIN — PROCHLORPERAZINE EDISYLATE 5 MG: 5 INJECTION INTRAMUSCULAR; INTRAVENOUS at 16:58

## 2024-01-25 RX ADMIN — ROCURONIUM BROMIDE 30 MG: 50 INJECTION, SOLUTION INTRAVENOUS at 14:54

## 2024-01-25 RX ADMIN — DEXAMETHASONE SODIUM PHOSPHATE 10 MG: 10 INJECTION INTRAMUSCULAR; INTRAVENOUS at 15:12

## 2024-01-25 RX ADMIN — ACETAMINOPHEN 1000 MG: 500 TABLET, FILM COATED ORAL at 12:39

## 2024-01-25 RX ADMIN — ONDANSETRON 8 MG: 2 INJECTION INTRAMUSCULAR; INTRAVENOUS at 15:12

## 2024-01-25 RX ADMIN — Medication 3 MG: at 15:53

## 2024-01-25 RX ADMIN — ROCURONIUM BROMIDE 20 MG: 50 INJECTION, SOLUTION INTRAVENOUS at 15:07

## 2024-01-25 RX ADMIN — LIDOCAINE HYDROCHLORIDE 80 MG: 20 INJECTION, SOLUTION EPIDURAL; INFILTRATION; INTRACAUDAL; PERINEURAL at 14:53

## 2024-01-25 RX ADMIN — HYDROMORPHONE HYDROCHLORIDE 0.5 MG: 1 INJECTION, SOLUTION INTRAMUSCULAR; INTRAVENOUS; SUBCUTANEOUS at 16:31

## 2024-01-25 RX ADMIN — Medication 2000 MG: at 14:47

## 2024-01-25 RX ADMIN — HYDROMORPHONE HYDROCHLORIDE 0.5 MG: 1 INJECTION, SOLUTION INTRAMUSCULAR; INTRAVENOUS; SUBCUTANEOUS at 16:26

## 2024-01-25 ASSESSMENT — ENCOUNTER SYMPTOMS
EYE ITCHING: 0
EYE DISCHARGE: 0
SINUS PRESSURE: 1
DIARRHEA: 1
COUGH: 1
SORE THROAT: 0
VOMITING: 1
SINUS PAIN: 0
SHORTNESS OF BREATH: 1
COLOR CHANGE: 0
BACK PAIN: 1
ABDOMINAL PAIN: 1
NAUSEA: 1
EYE PAIN: 0
CONSTIPATION: 1

## 2024-01-25 ASSESSMENT — PAIN - FUNCTIONAL ASSESSMENT: PAIN_FUNCTIONAL_ASSESSMENT: 0-10

## 2024-01-25 ASSESSMENT — PAIN DESCRIPTION - DESCRIPTORS: DESCRIPTORS: ACHING

## 2024-01-25 ASSESSMENT — PAIN SCALES - GENERAL
PAINLEVEL_OUTOF10: 8
PAINLEVEL_OUTOF10: 4
PAINLEVEL_OUTOF10: 4
PAINLEVEL_OUTOF10: 8

## 2024-01-25 NOTE — ANESTHESIA POSTPROCEDURE EVALUATION
Department of Anesthesiology  Postprocedure Note    Patient: Alisha Rivera  MRN: [id number]  YOB: 1975  Date of evaluation: 1/25/2024    Procedure Summary       Date: 01/25/24 Room / Location: [facility] MAIN OR  / [facility] MAIN OR    Anesthesia Start: 1441 Anesthesia Stop: 1617    Procedure: CHOLECYSTECTOMY LAPAROSCOPIC ROBOTIC (Abdomen) Diagnosis:       Gallstone      (Gallstone [K80.20])    Surgeons: Johanna Christensen MD Responsible Provider: Héctor Champion MD    Anesthesia Type: general ASA Status: 2            Anesthesia Type: No value filed.    Jewels Phase I: Jewels Score: 9    Jewels Phase II:      Anesthesia Post Evaluation    Patient location during evaluation: PACU  Patient participation: complete - patient participated  Level of consciousness: awake and alert  Airway patency: patent  Nausea & Vomiting: no nausea and no vomiting  Cardiovascular status: hemodynamically stable  Respiratory status: acceptable, nonlabored ventilation and spontaneous ventilation  Hydration status: euvolemic  Comments: /88   Pulse 86   Temp 97 °F (36.1 °C) (Temporal)   Resp 19   Ht 1.676 m (5' 6\")   Wt 101.1 kg (222 lb 12.8 oz)   SpO2 92%   BMI 35.96 kg/m²     Multimodal analgesia pain management approach  Pain management: adequate and satisfactory to patient    No notable events documented.

## 2024-01-25 NOTE — H&P
Johanna Kaufman MD   Bariatric & [facility] Lapar[facility] Endoscopy  [address], [address]  [place] [address]  Phone [phone]   Fax [phone]      Date of visit: 2023      Primary/Requesting provider: Miladys Canchola APRN - CNP         Name: Alisha Rivera      MRN: [id number]       : 1975       Age: 48 y.o.    Sex: female        PCP: Miladys Canchola APRN - CNP     CC:    Chief Complaint   Patient presents with    New Patient     NP - ED FU - Gallbladder       HPI:    Alisha Rivera is a 48 y.o. female who presents for evaluation of upper abdominal pain that started 1 year ago. Pain flares intermittently.  Pain is 8/10.  Pain is better with nothing and worse with bending.  Pain is associated with nausea and vomiting.  She went to the ED last week and was found to have gallstones.     No pain with eating.     Previous abdominal surgeries - appendectomy, endometriosis  Last colonoscopy - 2023 - +polyps  EGD - Wilson's esophagus with dysplasia, gastric ulcer and hiatal hernia    Blood thinners - denies  Immunosuppressants - denies        PMH:    Past Medical History:   Diagnosis Date    ADHD (attention deficit hyperactivity disorder)     Anemia     Anxiety state, unspecified 2013    Bipolar disorder (HCC) 2013    Contact dermatitis and eczema due to cause     Cystic acne    Depression     Diabetes mellitus (HCC) 2016    Endometriosis 2015    Had surgery for 17 removals    Fibroid, uterine 2015    Had removal of two large at same time as endo surgery    GERD (gastroesophageal reflux disease)     Hypothyroidism     IBS (irritable bowel syndrome)     Infertility, female Not fertile    Menopausal symptoms 2022    Migraine     Obesity 2018    Overactive bladder     Psychotic disorder (HCC) Schizophrenia    Since late 20s    Seizures (HCC)     As child    Sleep apnea Need assessment    Trauma     Physical and

## 2024-01-25 NOTE — ANESTHESIA PRE PROCEDURE
(15.0 ttl pk-yrs)     Types: Cigarettes     Start date: 1990     Quit date: 2005     Years since quittin.9   • Smokeless tobacco: Never   Substance Use Topics   • Alcohol use: Never                                Counseling given: Not Answered      Vital Signs (Current):   Vitals:    24 1601 24 1202 24 1209   BP:   112/75   Pulse:   (!) 111   Resp:   18   Temp:   98.4 °F (36.9 °C)   TempSrc:   Oral   SpO2:   97%   Weight: 99.8 kg (220 lb) 101.1 kg (222 lb 12.8 oz)    Height: 1.676 m (5' 6\") 1.676 m (5' 6\")                                               BP Readings from Last 3 Encounters:   24 112/75   23 90/61   23 101/71       NPO Status: Time of last liquid consumption:                         Time of last solid consumption:                         Date of last liquid consumption: 24                        Date of last solid food consumption: 24    BMI:   Wt Readings from Last 3 Encounters:   24 101.1 kg (222 lb 12.8 oz)   01/10/24 99.8 kg (220 lb)   23 100.4 kg (221 lb 6.4 oz)     Body mass index is 35.96 kg/m².    CBC:   Lab Results   Component Value Date/Time    WBC 9.7 2023 05:59 PM    RBC 4.90 2023 05:59 PM    HGB 13.8 2023 05:59 PM    HCT 43.1 2023 05:59 PM    MCV 88.0 2023 05:59 PM    MCV 90.8 10/13/2023 01:02 PM    RDW 13.5 2023 05:59 PM     2023 05:59 PM       CMP:   Lab Results   Component Value Date/Time     2023 05:59 PM    K 3.6 2023 05:59 PM     2023 05:59 PM    CO2 26 2023 05:59 PM    BUN 7 2023 05:59 PM    CREATININE 1.10 2023 05:59 PM    GFRAA >60 2022 09:52 AM    AGRATIO 1.2 10/13/2023 11:38 AM    AGRATIO 1.8 2021 03:32 PM    LABGLOM >60 2023 05:59 PM    GLUCOSE 133 2023 05:59 PM    PROT 7.3 10/13/2023 11:38 AM    CALCIUM 9.9 2023 05:59 PM    BILITOT 0.5 10/13/2023 11:38 AM    ALKPHOS 78

## 2024-01-25 NOTE — OP NOTE
Operative Note      Patient: Alisha Rivera  YOB: 1975  MRN: [id number]    Date of Procedure: 1/25/2024    Pre-Op Diagnosis: Gallstone [K80.20]    Post-Op Diagnosis: Same, chronic cholecystitis       Procedure(s):  CHOLECYSTECTOMY LAPAROSCOPIC ROBOTIC    Surgeon(s):  Johanna Christensen MD    Assistant:   * No surgical staff found *    Anesthesia: General    Estimated Blood Loss (mL): Minimal    Complications: None    Specimens:   ID Type Source Tests Collected by Time Destination   A : gallbladder Tissue Gallbladder SURGICAL PATHOLOGY Johanna Christensen MD 1/25/2024 1544        Implants:  Implant Name Type Inv. Item Serial No.  Lot No. LRB No. Used Action   CLIP INT M L POLYMER MARVIN LIG HEM O MARVIN - [id number]  CLIP INT M L POLYMER MARVIN LIG HEM O MARVIN  [employer] [id number] N/A 1 Implanted         Drains: * No LDAs found *    Findings: normal anatomy. Chronic cholecystitis with many omental adhesions to the gallbladder.     Detailed Description of Procedure:   After informed consent was taken, patient was taken to the operating room and placed in supine position. Anesthesia was induced and patient was intubated. Patient received preoperative antibiotics. SCDs were placed. Abdomen was prepped and draped in standard sterile fashion. A timeout was performed with all team member present.    A 1 cm horizontal incision was made to the left of the umbilicus. A Veress needle was inserted. Saline drop test was normal. The abdomen was insufflated with carbon dioxide to a pressure of 15 mmHg. The patient tolerated the insufflation well. A 8 mm trocar was inserted using an Optiview technique. A general survey was performed and no injury was observed from trocar placement. Three additional 8 mm robotic cannulas were inserted in the right and left upper quadrant under direct visualization. The patient was placed in reverse Trendelenberg with his right side up. The robot was

## 2024-01-25 NOTE — ANESTHESIA PROCEDURE NOTES
Airway  Date/Time: 1/25/2024 2:57 PM  Urgency: elective    Airway not difficult    General Information and Staff    Patient location during procedure: OR  Anesthesiologist: Héctor Champion MD  Resident/CRNA: Alex Frank APRN - CRNA  Performed: resident/CRNA   Performed by: Alex Frank APRN - CRNA  Authorized by: Héctor Champion MD      Indications and Patient Condition  Indications for airway management: anesthesia  Spontaneous Ventilation: absent  Sedation level: deep  Preoxygenated: yes  Patient position: sniffing  MILS not maintained throughout  Mask difficulty assessment: vent by bag mask + OA or adjuvant +/- NMBA    Final Airway Details  Final airway type: endotracheal airway      Successful airway: ETT  Cuffed: yes   Successful intubation technique: direct laryngoscopy  Facilitating devices/methods: intubating stylet  Endotracheal tube insertion site: oral  Blade: Suárez  Blade size: #2  ETT size (mm): 7.0  Cormack-Lehane Classification: grade IIb - view of arytenoids or posterior of glottis only  Placement verified by: chest auscultation and capnometry   Measured from: teeth  ETT to teeth (cm): 23  Number of attempts at approach: 1  Ventilation between attempts: bag mask  Number of other approaches attempted: 0    no

## 2024-01-25 NOTE — DISCHARGE INSTRUCTIONS
No bathtub or pool for 2 weeks. Ok to shower.  No weight lifting greater than 20 lbs for 4 weeks.  Avoid fatty and spicy foods for 2-3 days and then introduce then slowly.    Leave the skin glue or Steri strips in place. They will fall off on their own in 7-10 days.    If not already scheduled, please call the office and schedule a 2 week postoperative follow up.    Take tylenol or ibuprofen for as needed for mild pain every 4-6 hours.   Percocet prescribed for mod to severe pain. This is a narcotic and can cause drowsiness, nausea and vomiting and constipation.  Take Colace 100mg BID (over the counter) if constipated. Instructions following Laparoscopic Cholecystectomy:    ACTIVITY:   Try to take a few short walks with help around the house later today. It is very important to take short walks to avoid blood clots and pneumonia.   You may be light-headed or sleepy from anesthesia, so be careful going up and down stairs.   Avoid any activity that may be dangerous or involves heavy objects for 24-48 hours.    DIET:   Drink only clear, non-carbonated liquids when you get home (sugar-free if you are diabetic), such as Gatorade, chicken broth, etc.   Tomorrow start soft foods such as grits and eggs, mashed potatoes, yogurt, cottage cheese, etc. Remain on soft foods for at least 24-36 hours then you may eat whatever foods you wish.   Many people experience nausea for a day or so after surgery, and many people have loose stools or diarrhea immediately after gallbladder surgery. It is also not uncommon to not have a bowel movement for 2-3 days.  Using Miralax or other over the counter laxative is fine.    PAIN:   You will be given a prescription for pain medication and nausea.   Try to take the pain medication with food, even a few crackers.   You may also use Tylenol, Motrin, Advil, or Aleve instead of the prescription pain medication. Do no take Tylenol and the prescription pain medication within 4 hours of each  other.   URINARY RETENTION: If you are unable to empty your bladder within 6 hours after returning home, please go to your nearest Emergency Department or Urgent Care for urinary catheterization.    WOUND CARE:   You may shower the day after surgery   It is not uncommon for the incisions to ooze or drain blood-tinged fluid. Cover the area with gauze if the drainage continues.   Incisions will sometimes develop redness around them, up to the size of a quarter, as well as a hard “lumpy” feel. If this redness continues to get larger, please call the office.    PLEASE NOTE: it is not uncommon to have pain and a \"knot\" to the left of the belly button.    CALL THE DOCTOR IF:   You have a temperature higher than 101.5° Fahrenheit for more than 6 hours.   You have severe nausea or vomiting not relieved by medication; or diarrhea.   You develop increasing redness or infection at the incision.   Continue home medications as previously prescribed.    MEDICATION INTERACTION:    During your procedure you potentially received a medication or medications which may reduce the effectiveness of oral contraceptives. Please consider other forms of contraception for 1 month following your procedure if you are currently using oral contraceptives as your primary form of birth control. In addition to this, we recommend continuing your oral contraceptive as prescribed, unless otherwise instructed by your physician, during this time.    After general anesthesia or intravenous sedation, for 24 hours or while taking prescription Narcotics:  Limit your activities  A responsible adult needs to be with you for the next 24 hours  Do not drive and operate hazardous machinery  Do not make important personal or business decisions  Do not drink alcoholic beverages  If you have not urinated within 8 hours after discharge, and you are experiencing discomfort from urinary retention, please go to the nearest ED.  If you have sleep apnea and have a CPAP

## 2024-01-26 NOTE — PROGRESS NOTES
difficulty urinating, dysuria, frequency and hematuria.         Physical Exam  Constitutional:       Appearance: Normal appearance.   Abdominal:      General: Bowel sounds are normal.      Palpations: Abdomen is soft.      Comments: Trochar sites approximated with no erythema or drainage    Musculoskeletal:         General: Normal range of motion.   Skin:     General: Skin is warm.      Capillary Refill: Capillary refill takes less than 2 seconds.   Neurological:      General: No focal deficit present.      Mental Status: She is alert and oriented to person, place, and time.   Psychiatric:         Mood and Affect: Mood normal.         Behavior: Behavior normal.         Assessment/Plan:  48 y.o. year-old female who presents for a post-op visit s/p Laparoscopic robotic cholecystectomy done per Dr. Menon on 1/25/2024.      -Follow up PRN  -Keep incisional sites clean and dry  -Avoid lifting objects > 20 pounds x 4 weeks post op   -Copy of path report discussed with patient   -Follow up with her PCP PRN for routine medical management   -Questran 1 pack po BID x 30 days.   -Purchase OTC hemorrhoid cream and/or suppositories   -Follow up with GI MD or PCP for bloody stools     AMBER Nath - NP

## 2024-01-31 ENCOUNTER — TELEPHONE (OUTPATIENT)
Dept: FAMILY MEDICINE CLINIC | Facility: CLINIC | Age: 49
End: 2024-01-31

## 2024-01-31 NOTE — TELEPHONE ENCOUNTER
So not sure what is going on with patients trulicity but she still has not got it and it looks like it never went though, can you check on it for me or maybe resend it. Thank you

## 2024-02-07 ENCOUNTER — OFFICE VISIT (OUTPATIENT)
Dept: SURGERY | Age: 49
End: 2024-02-07

## 2024-02-07 DIAGNOSIS — Z09 POSTOPERATIVE EXAMINATION: Primary | ICD-10-CM

## 2024-02-07 PROCEDURE — 99024 POSTOP FOLLOW-UP VISIT: CPT | Performed by: NURSE PRACTITIONER

## 2024-02-07 RX ORDER — CHOLESTYRAMINE 4 G/9G
1 POWDER, FOR SUSPENSION ORAL 2 TIMES DAILY
Qty: 60 PACKET | Refills: 0 | Status: SHIPPED | OUTPATIENT
Start: 2024-02-07 | End: 2024-03-08

## 2024-02-07 RX ORDER — FLUCONAZOLE 150 MG/1
150 TABLET ORAL ONCE
Qty: 1 TABLET | Refills: 0 | Status: SHIPPED | OUTPATIENT
Start: 2024-02-07 | End: 2024-02-07

## 2024-02-07 ASSESSMENT — ENCOUNTER SYMPTOMS
DIARRHEA: 1
BLOOD IN STOOL: 1

## 2024-02-07 NOTE — PATIENT INSTRUCTIONS
-Follow up PRN  -Keep incisional sites clean and dry  -Avoid lifting objects > 20 pounds x 4 weeks post op   -Copy of path report discussed with patient   -Follow up with her PCP PRN for routine medical management   -Questran 1 pack po BID x 30 days.   -Purchase OTC hemorrhoid cream and/or suppositories   -Follow up with GI MD or PCP for bloody stools

## 2024-02-19 ENCOUNTER — PATIENT MESSAGE (OUTPATIENT)
Dept: FAMILY MEDICINE CLINIC | Facility: CLINIC | Age: 49
End: 2024-02-19

## 2024-02-19 DIAGNOSIS — R73.03 PREDIABETES: Primary | ICD-10-CM

## 2024-02-19 DIAGNOSIS — E55.9 VITAMIN D DEFICIENCY: ICD-10-CM

## 2024-02-19 DIAGNOSIS — K21.9 GASTROESOPHAGEAL REFLUX DISEASE WITHOUT ESOPHAGITIS: ICD-10-CM

## 2024-02-19 DIAGNOSIS — R00.2 PALPITATION: ICD-10-CM

## 2024-02-19 DIAGNOSIS — E78.5 DYSLIPIDEMIA: ICD-10-CM

## 2024-02-20 NOTE — TELEPHONE ENCOUNTER
From: Alisha Rivera  To: Miladys Canchola  Sent: 2/19/2024 12:40 PM EST  Subject: Lab order     Hello,  Do you want to place a lab order so I can do it before my appointment? Can we check my stool for blood? vitamin d, anemia, a1c   ThanksYanira

## 2024-02-20 NOTE — TELEPHONE ENCOUNTER
Labs have been ordered. Please let pt know so that she can come to have the labs drawn a few days before her appt. I have placed an order for thyroid, metabolic panel to check liver, kidney functions and electrolytes, blood count to check for anemia, A1c, cholesterol, and vitamin D.  Thank you!

## 2024-04-02 ENCOUNTER — TELEPHONE (OUTPATIENT)
Dept: OBGYN CLINIC | Age: 49
End: 2024-04-02

## 2024-04-02 NOTE — TELEPHONE ENCOUNTER
Patient LM stating she needs a refill on aygestin for the year.     After researching her chart. I noticed her last visit with our office was 11/09/2022 for AE. Patient did send My Chart message about aygestin 05/17/2023. Rx was last written in Nov 2022.     LMVM for patient to call the office regarding her message she left. jeane

## 2024-04-04 ENCOUNTER — TELEPHONE (OUTPATIENT)
Dept: OBGYN CLINIC | Age: 49
End: 2024-04-04

## 2024-04-04 NOTE — TELEPHONE ENCOUNTER
Pt lvm stating she is needing another year supply of her Aygestin and does not need a pap smear since they have been normal. Called pt back, no answer, LVM.     Pt needs AE.

## 2024-04-17 NOTE — TELEPHONE ENCOUNTER
Patient LM stating she needs refill on aygestin.     Patient was scheduled for AE on 05/06. Rx pend to be sent. Patient voiced understanding. jeane

## 2024-04-25 ENCOUNTER — HOSPITAL ENCOUNTER (EMERGENCY)
Age: 49
Discharge: HOME | End: 2024-04-26
Payer: MEDICARE

## 2024-04-25 DIAGNOSIS — R19.7 DIARRHEA, UNSPECIFIED TYPE: Primary | ICD-10-CM

## 2024-04-25 LAB
ALBUMIN SERPL-MCNC: 4.5 G/DL (ref 3.5–5)
ALBUMIN/GLOB SERPL: 1.2 (ref 1–1.9)
ALP SERPL-CCNC: 78 U/L (ref 35–104)
ALT SERPL-CCNC: 71 U/L (ref 12–65)
ANION GAP SERPL CALC-SCNC: 12 MMOL/L (ref 9–18)
AST SERPL-CCNC: 36 U/L (ref 15–37)
BASOPHILS # BLD: 0 K/UL (ref 0–0.2)
BASOPHILS NFR BLD: 0 % (ref 0–2)
BILIRUB SERPL-MCNC: 0.3 MG/DL (ref 0–1.2)
BUN SERPL-MCNC: 7 MG/DL (ref 6–23)
CALCIUM SERPL-MCNC: 9.6 MG/DL (ref 8.8–10.2)
CHLORIDE SERPL-SCNC: 104 MMOL/L (ref 98–107)
CO2 SERPL-SCNC: 22 MMOL/L (ref 20–28)
CREAT SERPL-MCNC: 0.84 MG/DL (ref 0.6–1.1)
DIFFERENTIAL METHOD BLD: ABNORMAL
EOSINOPHIL # BLD: 0.2 K/UL (ref 0–0.8)
EOSINOPHIL NFR BLD: 2 % (ref 0.5–7.8)
ERYTHROCYTE [DISTWIDTH] IN BLOOD BY AUTOMATED COUNT: 13.5 % (ref 11.9–14.6)
GLOBULIN SER CALC-MCNC: 3.6 G/DL (ref 2.3–3.5)
GLUCOSE SERPL-MCNC: 94 MG/DL (ref 70–99)
HCG UR QL: NEGATIVE
HCT VFR BLD AUTO: 42.1 % (ref 35.8–46.3)
HGB BLD-MCNC: 13.8 G/DL (ref 11.7–15.4)
IMM GRANULOCYTES # BLD AUTO: 0.1 K/UL (ref 0–0.5)
IMM GRANULOCYTES NFR BLD AUTO: 1 % (ref 0–5)
LACTATE SERPL-SCNC: 1 MMOL/L (ref 0.5–2)
LIPASE SERPL-CCNC: 54 U/L (ref 13–60)
LYMPHOCYTES # BLD: 2.7 K/UL (ref 0.5–4.6)
LYMPHOCYTES NFR BLD: 29 % (ref 13–44)
MCH RBC QN AUTO: 28.3 PG (ref 26.1–32.9)
MCHC RBC AUTO-ENTMCNC: 32.8 G/DL (ref 31.4–35)
MCV RBC AUTO: 86.4 FL (ref 82–102)
MONOCYTES # BLD: 0.6 K/UL (ref 0.1–1.3)
MONOCYTES NFR BLD: 6 % (ref 4–12)
NEUTS SEG # BLD: 5.8 K/UL (ref 1.7–8.2)
NEUTS SEG NFR BLD: 62 % (ref 43–78)
NRBC # BLD: 0 K/UL (ref 0–0.2)
PLATELET # BLD AUTO: 349 K/UL (ref 150–450)
PMV BLD AUTO: 8.9 FL (ref 9.4–12.3)
POTASSIUM SERPL-SCNC: 3.5 MMOL/L (ref 3.5–5.1)
PROCALCITONIN SERPL-MCNC: 0.06 NG/ML (ref 0–0.1)
PROT SERPL-MCNC: 8 G/DL (ref 6.3–8.2)
RBC # BLD AUTO: 4.87 M/UL (ref 4.05–5.2)
SODIUM SERPL-SCNC: 138 MMOL/L (ref 136–145)
WBC # BLD AUTO: 9.3 K/UL (ref 4.3–11.1)

## 2024-04-25 PROCEDURE — 81003 URINALYSIS AUTO W/O SCOPE: CPT

## 2024-04-25 PROCEDURE — 2580000003 HC RX 258

## 2024-04-25 PROCEDURE — 99284 EMERGENCY DEPT VISIT MOD MDM: CPT

## 2024-04-25 PROCEDURE — 81025 URINE PREGNANCY TEST: CPT

## 2024-04-25 PROCEDURE — 6360000002 HC RX W HCPCS

## 2024-04-25 PROCEDURE — 80053 COMPREHEN METABOLIC PANEL: CPT

## 2024-04-25 PROCEDURE — 87040 BLOOD CULTURE FOR BACTERIA: CPT

## 2024-04-25 PROCEDURE — 84145 PROCALCITONIN (PCT): CPT

## 2024-04-25 PROCEDURE — 96374 THER/PROPH/DIAG INJ IV PUSH: CPT

## 2024-04-25 PROCEDURE — 96361 HYDRATE IV INFUSION ADD-ON: CPT

## 2024-04-25 PROCEDURE — 83605 ASSAY OF LACTIC ACID: CPT

## 2024-04-25 PROCEDURE — 85025 COMPLETE CBC W/AUTO DIFF WBC: CPT

## 2024-04-25 PROCEDURE — 83690 ASSAY OF LIPASE: CPT

## 2024-04-25 RX ORDER — ONDANSETRON 2 MG/ML
4 INJECTION INTRAMUSCULAR; INTRAVENOUS
Status: COMPLETED | OUTPATIENT
Start: 2024-04-25 | End: 2024-04-25

## 2024-04-25 RX ORDER — 0.9 % SODIUM CHLORIDE 0.9 %
1000 INTRAVENOUS SOLUTION INTRAVENOUS ONCE
Status: COMPLETED | OUTPATIENT
Start: 2024-04-25 | End: 2024-04-26

## 2024-04-25 RX ADMIN — ONDANSETRON 4 MG: 2 INJECTION INTRAMUSCULAR; INTRAVENOUS at 23:29

## 2024-04-25 RX ADMIN — SODIUM CHLORIDE 1000 ML: 9 INJECTION, SOLUTION INTRAVENOUS at 23:30

## 2024-04-25 ASSESSMENT — PAIN DESCRIPTION - DESCRIPTORS: DESCRIPTORS: ACHING

## 2024-04-25 ASSESSMENT — PAIN DESCRIPTION - LOCATION: LOCATION: ABDOMEN

## 2024-04-25 ASSESSMENT — PAIN - FUNCTIONAL ASSESSMENT: PAIN_FUNCTIONAL_ASSESSMENT: 0-10

## 2024-04-26 VITALS
HEART RATE: 99 BPM | TEMPERATURE: 97.8 F | DIASTOLIC BLOOD PRESSURE: 69 MMHG | OXYGEN SATURATION: 99 % | RESPIRATION RATE: 16 BRPM | SYSTOLIC BLOOD PRESSURE: 108 MMHG | HEIGHT: 66 IN | WEIGHT: 214 LBS | BODY MASS INDEX: 34.39 KG/M2

## 2024-04-26 LAB
BILIRUB UR QL: NEGATIVE
GLUCOSE UR QL STRIP.AUTO: NEGATIVE MG/DL
KETONES UR-MCNC: NEGATIVE MG/DL
LEUKOCYTE ESTERASE UR QL STRIP: NEGATIVE
NITRITE UR QL: NEGATIVE
PH UR: 7 (ref 5–9)
PROT UR QL: ABNORMAL MG/DL
RBC # UR STRIP: ABNORMAL
SP GR UR: >1.03 (ref 1–1.02)
UROBILINOGEN UR QL: 0.2 EU/DL (ref 0.2–1)

## 2024-04-26 PROCEDURE — 2580000003 HC RX 258

## 2024-04-26 PROCEDURE — 6360000002 HC RX W HCPCS

## 2024-04-26 PROCEDURE — A4216 STERILE WATER/SALINE, 10 ML: HCPCS

## 2024-04-26 PROCEDURE — 6370000000 HC RX 637 (ALT 250 FOR IP)

## 2024-04-26 PROCEDURE — 96375 TX/PRO/DX INJ NEW DRUG ADDON: CPT

## 2024-04-26 PROCEDURE — C9113 INJ PANTOPRAZOLE SODIUM, VIA: HCPCS

## 2024-04-26 RX ORDER — PANTOPRAZOLE SODIUM 20 MG/1
20 TABLET, DELAYED RELEASE ORAL
Qty: 30 TABLET | Refills: 0 | Status: SHIPPED | OUTPATIENT
Start: 2024-04-26 | End: 2024-05-26

## 2024-04-26 RX ORDER — LOPERAMIDE HYDROCHLORIDE 2 MG/1
2 CAPSULE ORAL 4 TIMES DAILY PRN
Qty: 20 CAPSULE | Refills: 0 | Status: SHIPPED | OUTPATIENT
Start: 2024-04-26 | End: 2024-05-01

## 2024-04-26 RX ORDER — SUCRALFATE 1 G/1
1 TABLET ORAL
Status: COMPLETED | OUTPATIENT
Start: 2024-04-26 | End: 2024-04-26

## 2024-04-26 RX ORDER — ONDANSETRON 4 MG/1
4 TABLET, ORALLY DISINTEGRATING ORAL 3 TIMES DAILY PRN
Qty: 21 TABLET | Refills: 0 | Status: SHIPPED | OUTPATIENT
Start: 2024-04-26

## 2024-04-26 RX ADMIN — SUCRALFATE 1 G: 1 TABLET ORAL at 00:17

## 2024-04-26 RX ADMIN — PANTOPRAZOLE SODIUM 40 MG: 40 INJECTION, POWDER, FOR SOLUTION INTRAVENOUS at 00:18

## 2024-04-26 ASSESSMENT — PAIN - FUNCTIONAL ASSESSMENT: PAIN_FUNCTIONAL_ASSESSMENT: 0-10

## 2024-04-26 ASSESSMENT — PAIN SCALES - GENERAL: PAINLEVEL_OUTOF10: 0

## 2024-04-26 NOTE — ED NOTES
Pt did not want to finish rest of 1L fluid bolus before discharge. Pt received around 500mL Joel Strickland RN  04/26/24 0040

## 2024-04-26 NOTE — ED TRIAGE NOTES
Patient arrives ambulatory to triage. Reports diarrhea every day since January. States she was taking dicyclomine without relief. Endorses abdominal cramping and nausea/vomiting. Seen at [facility] last night for same complaint.

## 2024-04-26 NOTE — DISCHARGE INSTRUCTIONS
Please begin taking the Protonix as prescribed as well as the Imodium, I wanted to place you on this instead of the cholecystyramine for your diarrhea.  Continue to stay hydrated and push fluids.  Please follow-up with GI, their numbers listed above.  Return to the ED immediately if you begin to experience any abdominal pain, uncontrolled vomiting, blood in your stool, or any new or worsening symptoms.

## 2024-04-26 NOTE — ED PROVIDER NOTES
Emergency Department Provider Note       PCP: Cooksey, Erin L, MD   Age: 49 y.o.   Sex: female     DISPOSITION Decision To Discharge 04/26/2024 12:46:55 AM       ICD-10-CM    1. Diarrhea, unspecified type  R19.7 [facility]          Medical Decision Making     Patient is a 49-year-old female status postcholecystectomy on 1/25/24 with Dr. Menon presenting with abdominal pain and diarrhea.  She states she has been struggling with diarrhea since the surgery but has been able to manage at home.  Over the past 3 days she has had worsening diarrhea that she notes is dark black in color with some associated epigastric pain.  She was evaluated at [facility] yesterday who performed lab work and did a CT scan that was unremarkable and sent her to follow-up with her primary care provider and surgeon.      On presentation, patient is afebrile, vital signs are stable, and she is well-appearing in no acute distress.  She is slightly tachycardic at 112 bpm.   She has some minimal tenderness to palpation in her upper abdominal region without any rebound, guarding, or distention.  She states her pain has improved since since her visit to [facility] yesterday.    Will obtain lab work including lactic, Pro-Luan, and a blood culture given patient's initial tachycardia.  Will also obtain a Hemoccult.  Will hold off on CT scan at this time pending lab work as she did have 1 performed yesterday and her symptoms have improved.    CBC with stable H&H and no evidence of leukocytosis.  CMP was stable electrolytes, kidney, and hepatic function.  Lactic and Pro-Luan are negative.  Patient feels improved after antiemetics and fluids.    Hemoccult was negative, adequate stool sample was obtained.    Patient felt as if she was able to give us a stool sample in facility but was unable to.  Do have low suspicion for infectious diarrhea at this time as her symptoms have been ongoing since her cholecystectomy, she has no

## 2024-04-26 NOTE — ED NOTES
Patient mobility status  with no difficulty. Provider aware     I have reviewed discharge instructions with the patient.  The patient verbalized understanding.    Patient left ED via Discharge Method: ambulatory to Home with Parent.    Opportunity for questions and clarification provided.     Patient given 3 scripts.           Joel Lundberg RN  04/26/24 0047

## 2024-04-27 LAB
BACTERIA SPEC CULT: NORMAL
SERVICE CMNT-IMP: NORMAL

## 2024-04-30 LAB
BACTERIA SPEC CULT: NORMAL
SERVICE CMNT-IMP: NORMAL

## 2024-05-29 PROBLEM — K22.70 BARRETT'S ESOPHAGUS: Status: ACTIVE | Noted: 2024-05-29

## 2024-05-29 RX ORDER — MONTELUKAST SODIUM 4 MG/1
1 TABLET, CHEWABLE ORAL
Qty: 90 TABLET | Refills: 2 | Status: CANCELLED | OUTPATIENT
Start: 2024-05-29

## 2024-05-30 ENCOUNTER — NURSE ONLY (OUTPATIENT)
Age: 49
End: 2024-05-30

## 2024-05-30 DIAGNOSIS — K22.70 BARRETT'S ESOPHAGUS WITHOUT DYSPLASIA: ICD-10-CM

## 2024-05-30 DIAGNOSIS — R19.7 DIARRHEA, UNSPECIFIED TYPE: Primary | ICD-10-CM

## 2024-05-30 PROCEDURE — NBSRV NON-BILLABLE SERVICE: Performed by: PHYSICIAN ASSISTANT

## 2024-05-30 NOTE — PATIENT INSTRUCTIONS
For diarrhea:   Take Imodium as needed for diarrhea. If you are frequently having urgent diarrhea following meals, schedule Imodium dose 30 minutes prior to meals. Be sure to stay well hydrated. Avoid alcohol as well as food and beverages high in fat, sugar, artificial sweeteners or stimulants such as nicotine or caffeine. Increase daily fiber intake to 25-35 grams daily either by dietary intake or an over-the-counter psyllium husk fiber supplement. Keep a diet journal to evaluate for any food triggers.

## 2024-05-30 NOTE — PROGRESS NOTES
Alisha Rivera (:  1975) is a 49 y.o. female new patient referred to our office for evaluation of the following chief complaint(s):  No chief complaint on file.        Assessment & Plan   ASSESSMENT/PLAN:  1. Diarrhea, unspecified type  2. Wilson's esophagus without dysplasia      -Patient called our office this morning requesting virtual visit but was unable to connect, probably due to change in visit time as I see VV at the end of the morning. Note is signed to facilitate future visits if she should choose to reschedule; no charge submitted.     -Noted to be due for repeat EGD for Wilson's surveillance.  -Up-to-date on surveillance colonoscopy; due again in .    Subjective   SUBJECTIVE/OBJECTIVE  Alisha Rivera is a 49 y.o. year old female with PMH pertinent for NIDDM, ELDA, IBS, GERD, anemia, hypothyroidism, obesity with BMI 34.54, endometriosis, depression, anxiety, bipolar disorder. Surgical history is pertinent for appendectomy, cholecystectomy 2024, endometrial ablation. Prior pertinent GI evaluation includes:     -Colonoscopy 2011 (Dr. Wilson): 5 mm sigmoid colon polyp; random colon biopsies unremarkable, sigmoid colon polyp hyperplastic  -Colonoscopy 2021 (Dr. Dunn): excellent prep, internal/external hemorrhoids, 3-4 mm ascending colon polyps (2), single cecum diverticulum; path - tubular adenoma colon polyps, random colon biopsies benign with occasional lymphoid aggregate; recall 5 years  -EGD 2022 (Dr. Dunn): C0M2 Zacarias's esophagus, no biopsies taken; procedure aborted to reduce risk of aspiration  -EGD 2023: gastric biopsy showed reactive gastropathy, negative H.pylori, GE junction biopsy consistent with Zacarias's esophagus without dysplasia    Patient was referred to our office for evaluation of diarrhea. Referral note reviewed from ED visit 2024. Patient c/o diarrhea, dark stools, epigastric pain. CBC, CMP, lipase, procalcitonin, lactate, UA were

## 2024-05-31 ENCOUNTER — TELEPHONE (OUTPATIENT)
Age: 49
End: 2024-05-31

## 2024-05-31 NOTE — TELEPHONE ENCOUNTER
Tried to reach patient to do VV without success on 5/30.    Also tried to call patient later to reschedule visit without success; no answer and voicemail was not set up/able to lvm.

## 2024-06-24 ENCOUNTER — OFFICE VISIT (OUTPATIENT)
Age: 49
End: 2024-06-24
Payer: MEDICARE

## 2024-06-24 ENCOUNTER — PREP FOR PROCEDURE (OUTPATIENT)
Dept: GASTROENTEROLOGY | Age: 49
End: 2024-06-24

## 2024-06-24 VITALS
HEART RATE: 119 BPM | RESPIRATION RATE: 17 BRPM | HEIGHT: 66 IN | BODY MASS INDEX: 35.52 KG/M2 | OXYGEN SATURATION: 98 % | SYSTOLIC BLOOD PRESSURE: 119 MMHG | DIASTOLIC BLOOD PRESSURE: 81 MMHG | WEIGHT: 221 LBS

## 2024-06-24 DIAGNOSIS — Z87.19 HISTORY OF BARRETT'S ESOPHAGUS: ICD-10-CM

## 2024-06-24 DIAGNOSIS — Z86.2 HX OF IRON DEFICIENCY ANEMIA: ICD-10-CM

## 2024-06-24 DIAGNOSIS — R09.A2 GLOBUS SENSATION: ICD-10-CM

## 2024-06-24 DIAGNOSIS — K58.0 IRRITABLE BOWEL SYNDROME WITH DIARRHEA: ICD-10-CM

## 2024-06-24 DIAGNOSIS — K21.9 GASTROESOPHAGEAL REFLUX DISEASE, UNSPECIFIED WHETHER ESOPHAGITIS PRESENT: ICD-10-CM

## 2024-06-24 DIAGNOSIS — K58.0 IRRITABLE BOWEL SYNDROME WITH DIARRHEA: Primary | ICD-10-CM

## 2024-06-24 LAB
BASOPHILS # BLD: 0.1 K/UL (ref 0–0.2)
BASOPHILS NFR BLD: 1 % (ref 0–2)
CRP SERPL HS-MCNC: 5.4 MG/L (ref 0–3)
DIFFERENTIAL METHOD BLD: ABNORMAL
EOSINOPHIL # BLD: 0.2 K/UL (ref 0–0.8)
EOSINOPHIL NFR BLD: 2 % (ref 0.5–7.8)
ERYTHROCYTE [DISTWIDTH] IN BLOOD BY AUTOMATED COUNT: 13.6 % (ref 11.9–14.6)
ERYTHROCYTE [SEDIMENTATION RATE] IN BLOOD: 10 MM/HR (ref 0–20)
FERRITIN SERPL-MCNC: 33 NG/ML (ref 8–388)
HCT VFR BLD AUTO: 44.7 % (ref 35.8–46.3)
HGB BLD-MCNC: 14.1 G/DL (ref 11.7–15.4)
IMM GRANULOCYTES # BLD AUTO: 0.1 K/UL (ref 0–0.5)
IMM GRANULOCYTES NFR BLD AUTO: 1 % (ref 0–5)
IRON SATN MFR SERPL: 12 % (ref 20–50)
IRON SERPL-MCNC: 51 UG/DL (ref 35–100)
LYMPHOCYTES # BLD: 2.4 K/UL (ref 0.5–4.6)
LYMPHOCYTES NFR BLD: 24 % (ref 13–44)
MCH RBC QN AUTO: 27.9 PG (ref 26.1–32.9)
MCHC RBC AUTO-ENTMCNC: 31.5 G/DL (ref 31.4–35)
MCV RBC AUTO: 88.3 FL (ref 82–102)
MONOCYTES # BLD: 0.5 K/UL (ref 0.1–1.3)
MONOCYTES NFR BLD: 5 % (ref 4–12)
NEUTS SEG # BLD: 6.9 K/UL (ref 1.7–8.2)
NEUTS SEG NFR BLD: 67 % (ref 43–78)
NRBC # BLD: 0 K/UL (ref 0–0.2)
PLATELET # BLD AUTO: 372 K/UL (ref 150–450)
PMV BLD AUTO: 9.1 FL (ref 9.4–12.3)
RBC # BLD AUTO: 5.06 M/UL (ref 4.05–5.2)
TIBC SERPL-MCNC: 433 UG/DL (ref 240–450)
TSH W FREE THYROID IF ABNORMAL: 0.6 UIU/ML (ref 0.27–4.2)
UIBC SERPL-MCNC: 382 UG/DL (ref 112–347)
WBC # BLD AUTO: 10.2 K/UL (ref 4.3–11.1)

## 2024-06-24 PROCEDURE — 99204 OFFICE O/P NEW MOD 45 MIN: CPT | Performed by: PHYSICIAN ASSISTANT

## 2024-06-24 PROCEDURE — G8417 CALC BMI ABV UP PARAM F/U: HCPCS | Performed by: PHYSICIAN ASSISTANT

## 2024-06-24 PROCEDURE — G8427 DOCREV CUR MEDS BY ELIG CLIN: HCPCS | Performed by: PHYSICIAN ASSISTANT

## 2024-06-24 PROCEDURE — 1036F TOBACCO NON-USER: CPT | Performed by: PHYSICIAN ASSISTANT

## 2024-06-24 RX ORDER — DICYCLOMINE HCL 20 MG
20 TABLET ORAL 2 TIMES DAILY
Qty: 60 TABLET | Refills: 2 | Status: SHIPPED | OUTPATIENT
Start: 2024-06-24

## 2024-06-24 RX ORDER — OMEPRAZOLE 40 MG/1
40 CAPSULE, DELAYED RELEASE ORAL
Qty: 180 CAPSULE | Refills: 0 | Status: SHIPPED | OUTPATIENT
Start: 2024-06-24

## 2024-06-24 RX ORDER — FAMOTIDINE 40 MG/1
40 TABLET, FILM COATED ORAL EVERY EVENING
Qty: 90 TABLET | Refills: 0 | Status: SHIPPED | OUTPATIENT
Start: 2024-06-24

## 2024-06-24 NOTE — PROGRESS NOTES
Alisha Rivera (:  1975) is a 49 y.o. female new patient referred to our office for evaluation of the following chief complaint(s):  New Patient        Assessment & Plan   ASSESSMENT/PLAN:  1. Irritable bowel syndrome with diarrhea  -     dicyclomine (BENTYL) 20 MG tablet; Take 1 tablet by mouth in the morning and at bedtime, Disp-60 tablet, R-2Normal  -     hyoscyamine (LEVSIN/SL) 125 MCG sublingual tablet; Place 1 tablet under the tongue every 6 hours as needed for Cramping or Diarrhea (abdominal discomfort/cramping), Disp-120 tablet, R-0Normal  -     Calprotectin Stool; Future  -     High sensitivity CRP; Future  -     Sedimentation Rate; Future  -     TSH with Reflex; Future  2. Gastroesophageal reflux disease, unspecified whether esophagitis present  -     omeprazole (PRILOSEC) 40 MG delayed release capsule; Take 1 capsule by mouth 2 times daily (before meals), Disp-180 capsule, R-0Normal  -     famotidine (PEPCID) 40 MG tablet; Take 1 tablet by mouth every evening, Disp-90 tablet, R-0Normal  -     H. Pylori Antigen, Stool; Future  3. Globus sensation  4. History of Wilson's esophagus  5. Hx of iron deficiency anemia  -     CBC with Auto Differential; Future  -     Ferritin; Future  -     Iron and TIBC; Future      -Noted to be due for repeat EGD for Wilson's surveillance. Has uncontrolled acid reflux. Counseled patient and provided written recommendations for diet and lifestyle modifications for GERD. Avoid tobacco, alcohol, NSAIDs. Start Prilosec 40 mg BID AC and Pepcid 40 mg QHS. Schedule EGD.   -Hx IBS as well as diarrhea related to gallbladder issues s/p cholecystectomy in January after which she reports returning to her baseline diarrhea. Previously has gotten relief from Bentyl. Try Bentyl 20 mg BID and Levsin PRN for cramping/bloating. Can increase frequency and/or consider bile acid binder pending response. Will check IBD screening though low suspicion. Will hold on colonoscopy for now

## 2024-06-25 DIAGNOSIS — E61.1 IRON DEFICIENCY: Primary | ICD-10-CM

## 2024-06-25 RX ORDER — SODIUM CHLORIDE 0.9 % (FLUSH) 0.9 %
5-40 SYRINGE (ML) INJECTION PRN
Status: CANCELLED | OUTPATIENT
Start: 2024-06-25

## 2024-06-25 RX ORDER — SODIUM CHLORIDE 0.9 % (FLUSH) 0.9 %
5-40 SYRINGE (ML) INJECTION EVERY 12 HOURS SCHEDULED
Status: CANCELLED | OUTPATIENT
Start: 2024-06-25

## 2024-06-25 RX ORDER — FERROUS SULFATE 325(65) MG
325 TABLET, DELAYED RELEASE (ENTERIC COATED) ORAL
Qty: 90 TABLET | Refills: 1 | Status: SHIPPED | OUTPATIENT
Start: 2024-06-25

## 2024-06-25 RX ORDER — SODIUM CHLORIDE 9 MG/ML
25 INJECTION, SOLUTION INTRAVENOUS PRN
Status: CANCELLED | OUTPATIENT
Start: 2024-06-25

## 2024-06-28 DIAGNOSIS — K58.0 IRRITABLE BOWEL SYNDROME WITH DIARRHEA: ICD-10-CM

## 2024-06-28 DIAGNOSIS — K21.9 GASTROESOPHAGEAL REFLUX DISEASE, UNSPECIFIED WHETHER ESOPHAGITIS PRESENT: ICD-10-CM

## 2024-07-02 LAB
CALPROTECTIN STL-MCNT: 34 UG/G (ref 0–120)
H PYLORI AG STL QL IA: NEGATIVE
SPECIMEN SOURCE: NORMAL

## 2024-07-29 ENCOUNTER — HOSPITAL ENCOUNTER (EMERGENCY)
Age: 49
Discharge: HOME | End: 2024-07-29
Attending: EMERGENCY MEDICINE
Payer: MEDICARE

## 2024-07-29 ENCOUNTER — APPOINTMENT (OUTPATIENT)
Dept: CT IMAGING | Age: 49
End: 2024-07-29
Payer: MEDICARE

## 2024-07-29 VITALS
RESPIRATION RATE: 18 BRPM | BODY MASS INDEX: 34.55 KG/M2 | SYSTOLIC BLOOD PRESSURE: 123 MMHG | WEIGHT: 215 LBS | DIASTOLIC BLOOD PRESSURE: 85 MMHG | HEART RATE: 100 BPM | HEIGHT: 66 IN | OXYGEN SATURATION: 97 % | TEMPERATURE: 98.1 F

## 2024-07-29 DIAGNOSIS — R42 DIZZINESS: ICD-10-CM

## 2024-07-29 DIAGNOSIS — R10.9 ABDOMINAL PAIN, UNSPECIFIED ABDOMINAL LOCATION: Primary | ICD-10-CM

## 2024-07-29 LAB
ALBUMIN SERPL-MCNC: 4.2 G/DL (ref 3.5–5)
ALBUMIN/GLOB SERPL: 1.3 (ref 1–1.9)
ALP SERPL-CCNC: 81 U/L (ref 35–104)
ALT SERPL-CCNC: 69 U/L (ref 12–65)
ANION GAP SERPL CALC-SCNC: 12 MMOL/L (ref 9–18)
AST SERPL-CCNC: 35 U/L (ref 15–37)
BASOPHILS # BLD: 0 K/UL (ref 0–0.2)
BASOPHILS NFR BLD: 0 % (ref 0–2)
BILIRUB SERPL-MCNC: 0.3 MG/DL (ref 0–1.2)
BUN SERPL-MCNC: 12 MG/DL (ref 6–23)
CALCIUM SERPL-MCNC: 9.7 MG/DL (ref 8.8–10.2)
CHLORIDE SERPL-SCNC: 108 MMOL/L (ref 98–107)
CO2 SERPL-SCNC: 22 MMOL/L (ref 20–28)
CREAT SERPL-MCNC: 0.79 MG/DL (ref 0.6–1.1)
DIFFERENTIAL METHOD BLD: ABNORMAL
EOSINOPHIL # BLD: 0.2 K/UL (ref 0–0.8)
EOSINOPHIL NFR BLD: 2 % (ref 0.5–7.8)
ERYTHROCYTE [DISTWIDTH] IN BLOOD BY AUTOMATED COUNT: 13.9 % (ref 11.9–14.6)
GLOBULIN SER CALC-MCNC: 3.3 G/DL (ref 2.3–3.5)
GLUCOSE SERPL-MCNC: 114 MG/DL (ref 70–99)
HCG UR QL: NEGATIVE
HCT VFR BLD AUTO: 46.4 % (ref 35.8–46.3)
HGB BLD-MCNC: 14.7 G/DL (ref 11.7–15.4)
IMM GRANULOCYTES # BLD AUTO: 0 K/UL (ref 0–0.5)
IMM GRANULOCYTES NFR BLD AUTO: 0 % (ref 0–5)
LIPASE SERPL-CCNC: 42 U/L (ref 13–60)
LYMPHOCYTES # BLD: 2 K/UL (ref 0.5–4.6)
LYMPHOCYTES NFR BLD: 22 % (ref 13–44)
MCH RBC QN AUTO: 28.2 PG (ref 26.1–32.9)
MCHC RBC AUTO-ENTMCNC: 31.7 G/DL (ref 31.4–35)
MCV RBC AUTO: 89.1 FL (ref 82–102)
MONOCYTES # BLD: 0.5 K/UL (ref 0.1–1.3)
MONOCYTES NFR BLD: 5 % (ref 4–12)
NEUTS SEG # BLD: 6.4 K/UL (ref 1.7–8.2)
NEUTS SEG NFR BLD: 71 % (ref 43–78)
NRBC # BLD: 0 K/UL (ref 0–0.2)
PLATELET # BLD AUTO: 368 K/UL (ref 150–450)
PMV BLD AUTO: 8.9 FL (ref 9.4–12.3)
POTASSIUM SERPL-SCNC: 4.2 MMOL/L (ref 3.5–5.1)
PROT SERPL-MCNC: 7.5 G/DL (ref 6.3–8.2)
RBC # BLD AUTO: 5.21 M/UL (ref 4.05–5.2)
SODIUM SERPL-SCNC: 141 MMOL/L (ref 136–145)
WBC # BLD AUTO: 9.1 K/UL (ref 4.3–11.1)

## 2024-07-29 PROCEDURE — 96374 THER/PROPH/DIAG INJ IV PUSH: CPT

## 2024-07-29 PROCEDURE — 83690 ASSAY OF LIPASE: CPT

## 2024-07-29 PROCEDURE — 80053 COMPREHEN METABOLIC PANEL: CPT

## 2024-07-29 PROCEDURE — 81025 URINE PREGNANCY TEST: CPT

## 2024-07-29 PROCEDURE — 81003 URINALYSIS AUTO W/O SCOPE: CPT

## 2024-07-29 PROCEDURE — 6360000002 HC RX W HCPCS: Performed by: EMERGENCY MEDICINE

## 2024-07-29 PROCEDURE — 99284 EMERGENCY DEPT VISIT MOD MDM: CPT

## 2024-07-29 PROCEDURE — 74176 CT ABD & PELVIS W/O CONTRAST: CPT

## 2024-07-29 PROCEDURE — 85025 COMPLETE CBC W/AUTO DIFF WBC: CPT

## 2024-07-29 RX ORDER — MECLIZINE HYDROCHLORIDE 25 MG/1
25 TABLET ORAL 3 TIMES DAILY PRN
Qty: 15 TABLET | Refills: 0 | Status: SHIPPED | OUTPATIENT
Start: 2024-07-29 | End: 2024-08-08

## 2024-07-29 RX ORDER — ONDANSETRON 4 MG/1
4 TABLET, ORALLY DISINTEGRATING ORAL 3 TIMES DAILY PRN
Qty: 21 TABLET | Refills: 0 | Status: SHIPPED | OUTPATIENT
Start: 2024-07-29

## 2024-07-29 RX ORDER — CELECOXIB 100 MG/1
100 CAPSULE ORAL 2 TIMES DAILY
Qty: 60 CAPSULE | Refills: 3 | Status: SHIPPED | OUTPATIENT
Start: 2024-07-29

## 2024-07-29 RX ORDER — KETOROLAC TROMETHAMINE 15 MG/ML
15 INJECTION, SOLUTION INTRAMUSCULAR; INTRAVENOUS ONCE
Status: COMPLETED | OUTPATIENT
Start: 2024-07-29 | End: 2024-07-29

## 2024-07-29 RX ADMIN — KETOROLAC TROMETHAMINE 15 MG: 15 INJECTION, SOLUTION INTRAMUSCULAR; INTRAVENOUS at 20:04

## 2024-07-29 ASSESSMENT — PAIN SCALES - GENERAL
PAINLEVEL_OUTOF10: 8
PAINLEVEL_OUTOF10: 8

## 2024-07-29 ASSESSMENT — ENCOUNTER SYMPTOMS
NAUSEA: 0
ABDOMINAL PAIN: 1

## 2024-07-29 ASSESSMENT — PAIN DESCRIPTION - ORIENTATION: ORIENTATION: LOWER

## 2024-07-29 ASSESSMENT — PAIN DESCRIPTION - DESCRIPTORS: DESCRIPTORS: ACHING

## 2024-07-29 ASSESSMENT — PAIN DESCRIPTION - LOCATION
LOCATION: ABDOMEN
LOCATION: CHEST

## 2024-07-29 ASSESSMENT — PAIN - FUNCTIONAL ASSESSMENT: PAIN_FUNCTIONAL_ASSESSMENT: 0-10

## 2024-07-29 NOTE — ED PROVIDER NOTES
Emergency Department Provider Note       PCP: None, None   Age: 49 y.o.   Sex: female     DISPOSITION Discharge - Pending Orders Complete 07/29/2024 08:50:27 PM       ICD-10-CM    1. Abdominal pain, unspecified abdominal location  R10.9       2. Dizziness  R42           Medical Decision Making     Patient is a 49-year-old female who presents with room spinning x 2 days no history of vertigo as well as nausea vomiting and chronic diarrhea.  Patient states she has had diarrhea for over a month and has been seen by GI and diagnosed with IBS with diarrhea given Bentyl as well as Levsin.  Patient does have a history of GERD and is on omeprazole and Pepcid.  Patient also has a history of Wilson's esophagus and iron deficiency anemia.  Patient has had a cholecystectomy in the past.          Abdominal Pain  Pain location:  Generalized  Pain quality: aching and dull    Pain radiates to:  Does not radiate  Pain severity:  Mild  Onset quality:  Gradual  Duration:  2 hours  Timing:  Intermittent  Progression:  Waxing and waning  Chronicity:  Chronic  Context: eating    Relieved by:  Nothing  Worsened by:  Nothing  Ineffective treatments:  None tried  Associated symptoms: no anorexia, no dysuria, no fatigue, no melena and no nausea      Differential diagnosis includes but is not limited to gastroenteritis, colitis, IBS    Patient's physical exam is unremarkable except for lower abdominal pain no rebound or guarding.  Patient laboratory testing is unremarkable.  Urine pregnancy negative.    Patient's pain is likely caused by IBS.  No evidence of colitis on CT scan.  Will DC home with prescription for Levsin and have patient follow-up with PCP and return for any worsening or changing symptoms.  Patient's dizziness has resolved.  Will give a prescription for meclizine if it returns.       1 or more acute illnesses that pose a threat to life or bodily function.   Shared medical decision making was utilized in creating the

## 2024-07-29 NOTE — ED TRIAGE NOTES
Pt to triage via WC with CO room spinning dizziness x 2 days with NV and CP. Pt reports CP \"feels like acid reflux\". Hx GERD. Pt unable to keep eyes open r/t dizziness.

## 2024-07-30 LAB
BILIRUB UR QL: ABNORMAL
GLUCOSE UR QL STRIP.AUTO: NEGATIVE MG/DL
KETONES UR-MCNC: 40 MG/DL
LEUKOCYTE ESTERASE UR QL STRIP: NEGATIVE
NITRITE UR QL: NEGATIVE
PH UR: 5.5 (ref 5–9)
PROT UR QL: ABNORMAL MG/DL
RBC # UR STRIP: NEGATIVE
SP GR UR: >1.03 (ref 1–1.02)
UROBILINOGEN UR QL: 0.2 EU/DL (ref 0.2–1)

## 2024-09-04 ENCOUNTER — TELEPHONE (OUTPATIENT)
Dept: OTHER | Facility: CLINIC | Age: 49
End: 2024-09-04

## 2024-12-13 ENCOUNTER — TELEPHONE (OUTPATIENT)
Dept: PHARMACY | Facility: CLINIC | Age: 49
End: 2024-12-13

## 2024-12-13 NOTE — TELEPHONE ENCOUNTER
[facility] CLINICAL PHARMACY: ADHERENCE REVIEW  Identified care gap per [employer]: fills at [facility]: Diabetes and Statin adherence    ASSESSMENT  DIABETES ADHERENCE    Insurance Records claims through 12/7/2024 (Prior Year PDC = 84% - PASSED ; YTD PDC = 85%; Potential Fail Date: 12/18/24):   Trulicity 0.75 mg last filled on 10/28/24 for 30 day supply. Next refill due: 11/27/24  Rxer RAHUL ETHEL - outside prescriber    Prescribed sig:  not on med list; removed previously this year possibly due to coverage?    Per Reconcile Dispense History:   TRULICITY 0.75 MG/0.5 ML PEN 10/28/2024 30 2 mL Rahul Álvarez APRN - NP [facility]/pharmacy #[id number] - A...   TRULICITY 0.75 MG/0.5 ML PEN 10/09/2023 84 6 mL Dinh Canchola, AMBER - CNP [facility]/pharmacy #[id number] - A...   TRULICITY 0.75 MG/0.5 ML PEN 06/12/2023 84 6 mL Dinh Canchola, APRN - CNP [facility]/pharmacy #[id number] - A...   0RR per hyperlink    Lab Results   Component Value Date    LABA1C 6.2 (H) 10/13/2023    LABA1C 5.5 06/12/2023    LABA1C 5.6 12/06/2022     NOTE: A1c not yet completed this calendar year    STATIN ADHERENCE - one fill impossible in 2024    PLAN  Per insurer report, LIS-2 - may be able to receive up to a 100-day supply for the same cost as a 30-day supply.    The following are interventions that have been identified:   Patient overdue refilling Trulicity. Unsure if patient is still prescribed this medication.   2 fill  Rxer RAHUL ETHEL  No PCP listed - unclear attribution and may need to update PCP with [facility]? Attributed provider per [facility] DINH CANCHOLA (LOV 10/13/23?)    Attempting to reach patient to review.  Left message asking for return call.    MyChart message sent.    Jalyn Rowley, PharmD, BCACP  [facility] Pharmacist  [facility] Clinical Pharmacy  Department, toll free: [phone], option 1     For Pharmacy Admin Tracking Only    Program: [employer]  CPA in place:  No  Gap Closed?: No   Time Spent (min): 15

## 2025-03-28 ENCOUNTER — HOSPITAL ENCOUNTER (EMERGENCY)
Age: 50
Discharge: HOME | End: 2025-03-29
Attending: EMERGENCY MEDICINE
Payer: MEDICARE

## 2025-03-28 DIAGNOSIS — K44.9 HIATAL HERNIA: ICD-10-CM

## 2025-03-28 DIAGNOSIS — R10.84 GENERALIZED ABDOMINAL PAIN: Primary | ICD-10-CM

## 2025-03-28 PROCEDURE — 85025 COMPLETE CBC W/AUTO DIFF WBC: CPT

## 2025-03-28 PROCEDURE — 83690 ASSAY OF LIPASE: CPT

## 2025-03-28 PROCEDURE — 99285 EMERGENCY DEPT VISIT HI MDM: CPT

## 2025-03-28 PROCEDURE — 81025 URINE PREGNANCY TEST: CPT

## 2025-03-28 PROCEDURE — 81003 URINALYSIS AUTO W/O SCOPE: CPT

## 2025-03-28 PROCEDURE — 80053 COMPREHEN METABOLIC PANEL: CPT

## 2025-03-28 ASSESSMENT — LIFESTYLE VARIABLES: HOW OFTEN DO YOU HAVE A DRINK CONTAINING ALCOHOL: NEVER

## 2025-03-28 ASSESSMENT — PAIN DESCRIPTION - LOCATION: LOCATION: ABDOMEN

## 2025-03-28 ASSESSMENT — PAIN - FUNCTIONAL ASSESSMENT: PAIN_FUNCTIONAL_ASSESSMENT: 0-10

## 2025-03-28 ASSESSMENT — PAIN SCALES - GENERAL: PAINLEVEL_OUTOF10: 7

## 2025-03-28 ASSESSMENT — PAIN DESCRIPTION - ORIENTATION: ORIENTATION: LEFT;UPPER

## 2025-03-29 ENCOUNTER — APPOINTMENT (OUTPATIENT)
Dept: CT IMAGING | Age: 50
End: 2025-03-29
Payer: MEDICARE

## 2025-03-29 ENCOUNTER — APPOINTMENT (OUTPATIENT)
Dept: GENERAL RADIOLOGY | Age: 50
End: 2025-03-29
Payer: MEDICARE

## 2025-03-29 VITALS
WEIGHT: 230 LBS | RESPIRATION RATE: 16 BRPM | DIASTOLIC BLOOD PRESSURE: 79 MMHG | OXYGEN SATURATION: 98 % | BODY MASS INDEX: 36.96 KG/M2 | HEIGHT: 66 IN | SYSTOLIC BLOOD PRESSURE: 126 MMHG | TEMPERATURE: 98.6 F | HEART RATE: 71 BPM

## 2025-03-29 LAB
ALBUMIN SERPL-MCNC: 4.3 G/DL (ref 3.5–5)
ALBUMIN/GLOB SERPL: 1.4 (ref 1–1.9)
ALP SERPL-CCNC: 87 U/L (ref 35–104)
ALT SERPL-CCNC: 42 U/L (ref 12–65)
ANION GAP SERPL CALC-SCNC: 14 MMOL/L (ref 7–16)
APPEARANCE UR: NORMAL
AST SERPL-CCNC: 21 U/L (ref 15–37)
BASOPHILS # BLD: 0.05 K/UL (ref 0–0.2)
BASOPHILS NFR BLD: 0.5 % (ref 0–2)
BILIRUB SERPL-MCNC: <0.2 MG/DL (ref 0–1.2)
BILIRUB UR QL: NEGATIVE
BUN SERPL-MCNC: 9 MG/DL (ref 6–23)
CALCIUM SERPL-MCNC: 9.3 MG/DL (ref 8.8–10.2)
CHLORIDE SERPL-SCNC: 107 MMOL/L (ref 98–107)
CO2 SERPL-SCNC: 19 MMOL/L (ref 20–29)
COLOR UR: YELLOW
CREAT SERPL-MCNC: 0.84 MG/DL (ref 0.8–1.3)
DIFFERENTIAL METHOD BLD: ABNORMAL
EOSINOPHIL # BLD: 0.31 K/UL (ref 0–0.8)
EOSINOPHIL NFR BLD: 3.1 % (ref 0.5–7.8)
ERYTHROCYTE [DISTWIDTH] IN BLOOD BY AUTOMATED COUNT: 13 % (ref 11.9–14.6)
GLOBULIN SER CALC-MCNC: 3.1 G/DL (ref 2.3–3.5)
GLUCOSE SERPL-MCNC: 107 MG/DL (ref 65–100)
GLUCOSE UR STRIP.AUTO-MCNC: NEGATIVE MG/DL
HCG UR QL: NEGATIVE
HCT VFR BLD AUTO: 40.9 % (ref 35.8–46.3)
HGB BLD-MCNC: 13.4 G/DL (ref 11.7–15.4)
HGB UR QL STRIP: NEGATIVE
IMM GRANULOCYTES # BLD AUTO: 0.06 K/UL (ref 0–0.5)
IMM GRANULOCYTES NFR BLD AUTO: 0.6 % (ref 0–5)
KETONES UR QL STRIP.AUTO: NEGATIVE MG/DL
LEUKOCYTE ESTERASE UR QL STRIP.AUTO: NEGATIVE
LIPASE SERPL-CCNC: 51 U/L (ref 13–60)
LYMPHOCYTES # BLD: 2.94 K/UL (ref 0.5–4.6)
LYMPHOCYTES NFR BLD: 29 % (ref 13–44)
MCH RBC QN AUTO: 29.1 PG (ref 26.1–32.9)
MCHC RBC AUTO-ENTMCNC: 32.8 G/DL (ref 31.4–35)
MCV RBC AUTO: 88.9 FL (ref 82–102)
MONOCYTES # BLD: 0.64 K/UL (ref 0.1–1.3)
MONOCYTES NFR BLD: 6.3 % (ref 4–12)
NEUTS SEG # BLD: 6.14 K/UL (ref 1.7–8.2)
NEUTS SEG NFR BLD: 60.5 % (ref 43–78)
NITRITE UR QL STRIP.AUTO: NEGATIVE
NRBC # BLD: 0 K/UL (ref 0–0.2)
PH UR STRIP: 6.5 (ref 5–9)
PLATELET # BLD AUTO: 314 K/UL (ref 150–450)
PMV BLD AUTO: 8.9 FL (ref 9.4–12.3)
POTASSIUM SERPL-SCNC: 3.8 MMOL/L (ref 3.5–5.1)
PROT SERPL-MCNC: 7.4 G/DL (ref 6.3–8.2)
PROT UR STRIP-MCNC: NEGATIVE MG/DL
RBC # BLD AUTO: 4.6 M/UL (ref 4.05–5.2)
SODIUM SERPL-SCNC: 140 MMOL/L (ref 133–143)
SP GR UR REFRACTOMETRY: 1.02 (ref 1–1.02)
UROBILINOGEN UR QL STRIP.AUTO: 0.2 EU/DL (ref 0.2–1)
WBC # BLD AUTO: 10.1 K/UL (ref 4.3–11.1)

## 2025-03-29 PROCEDURE — 74177 CT ABD & PELVIS W/CONTRAST: CPT

## 2025-03-29 PROCEDURE — 6360000004 HC RX CONTRAST MEDICATION: Performed by: EMERGENCY MEDICINE

## 2025-03-29 PROCEDURE — 71046 X-RAY EXAM CHEST 2 VIEWS: CPT

## 2025-03-29 RX ORDER — SUCRALFATE 1 G/1
1 TABLET ORAL 3 TIMES DAILY
Qty: 90 TABLET | Refills: 0 | Status: SHIPPED | OUTPATIENT
Start: 2025-03-29

## 2025-03-29 RX ORDER — IOPAMIDOL 755 MG/ML
100 INJECTION, SOLUTION INTRAVASCULAR
Status: COMPLETED | OUTPATIENT
Start: 2025-03-29 | End: 2025-03-29

## 2025-03-29 RX ADMIN — IOPAMIDOL 100 ML: 755 INJECTION, SOLUTION INTRAVENOUS at 00:46

## 2025-03-29 ASSESSMENT — PAIN - FUNCTIONAL ASSESSMENT: PAIN_FUNCTIONAL_ASSESSMENT: NONE - DENIES PAIN

## 2025-03-29 NOTE — ED TRIAGE NOTES
Ambulatory with steady gait into triage. Reports noticed \"Knot\" to left upper abdomen couple months ago. Reports has been feeling \"hot\" as well however denies having fevers. Reports diarrhea and hemorrhoid \"blocking stool\". Seen by PMD yesterday with lab work done.

## 2025-03-29 NOTE — ED PROVIDER NOTES
by mouth every evening    FERROUS SULFATE (FE TABS) 325 (65 FE) MG EC TABLET    Take 1 tablet by mouth daily (with breakfast)    HYOSCYAMINE (LEVSIN/SL) 125 MCG SUBLINGUAL TABLET    Place 1 tablet under the tongue every 6 hours as needed for Cramping or Diarrhea (abdominal discomfort/cramping)    NORETHINDRONE (AYGESTIN) 5 MG TABLET    Take 1 tablet by mouth daily    OMEPRAZOLE (PRILOSEC) 40 MG DELAYED RELEASE CAPSULE    Take 1 capsule by mouth 2 times daily (before meals)    ONDANSETRON (ZOFRAN-ODT) 4 MG DISINTEGRATING TABLET    Take 1 tablet by mouth 3 times daily as needed for Nausea or Vomiting    TOPIRAMATE (TOPAMAX) 100 MG TABLET    Take 1 tablet by mouth daily        Results from this emergency department visit:      Results for orders placed or performed during the hospital encounter of 03/28/25   CT ABDOMEN PELVIS W IV CONTRAST Additional Contrast? None    Narrative    CT OF THE ABDOMEN AND PELVIS    INDICATION: Left upper quadrant abdominal pain.    TECHNIQUE: Multiple 2D axial images were obtained through the abdomen and  pelvis. Reformatted images were included.  100mL of Isovue 370 intravenous  contrast was used for better evaluation of solid organs and vascular structures.   Radiation dose reduction techniques were used for this study.  All CT scans  performed at this facility use one or all of the following: Automated exposure  control, adjustment of the mA and/or kVp according to patient's size, iterative  reconstruction.    COMPARISON: CT abdomen and pelvis without contrast 7/29/2024    FINDINGS:  - LUNG BASES: No infiltrates or masses.    - LIVER: Diffuse fatty infiltration.  - GALLBLADDER/BILE DUCTS: Cholecystectomy, no biliary ductal dilatation.  - PANCREAS: Normal.  - SPLEEN: Normal.    - ADRENALS: Stable mild hypodense thickening of the left adrenal gland  adenomatous change or hyperplasia. Unremarkable right adrenal gland.  - KIDNEYS/URETERS: Stable 2 mm nonobstructive mid pole left renal

## 2025-03-29 NOTE — ED NOTES
Patient mobility status  with no difficulty.     I have reviewed discharge instructions with the patient.  The patient verbalized understanding.    Patient left ED via Discharge Method: ambulatory to Home with Friend.    Opportunity for questions and clarification provided.     Patient given 1 scripts.

## (undated) DEVICE — GENERAL ROBOTIC: Brand: MEDLINE INDUSTRIES, INC.

## (undated) DEVICE — MASTISOL ADHESIVE LIQ 2/3ML

## (undated) DEVICE — SUCTION IRRIGATOR: Brand: ENDOWRIST

## (undated) DEVICE — SEAL

## (undated) DEVICE — INSUFFLATION NEEDLE TO ESTABLISH PNEUMOPERITONEUM.: Brand: INSUFFLATION NEEDLE

## (undated) DEVICE — GLOVE SURG SZ 7 L12IN FNGR THK79MIL GRN LTX FREE

## (undated) DEVICE — COLUMN DRAPE

## (undated) DEVICE — APPLICATOR MEDICATED 26 CC SOLUTION HI LT ORNG CHLORAPREP

## (undated) DEVICE — ARM DRAPE

## (undated) DEVICE — STRIP,CLOSURE,WOUND,MEDI-STRIP,1/2X4: Brand: MEDLINE

## (undated) DEVICE — GARMENT,MEDLINE,DVT,INT,CALF,MED, GEN2: Brand: MEDLINE

## (undated) DEVICE — GLOVE SURG SZ 65 THK91MIL LTX FREE SYN POLYISOPRENE

## (undated) DEVICE — BLADELESS OBTURATOR: Brand: WECK VISTA

## (undated) DEVICE — SUTURE MCRYL SZ 4-0 L18IN ABSRB UD L19MM PS-2 3/8 CIR PRIM Y496G

## (undated) DEVICE — SUTURE SZ 0 27IN 5/8 CIR UR-6  TAPER PT VIOLET ABSRB VICRYL J603H

## (undated) DEVICE — MARKER SURG SKIN UTIL BLK REG TIP NONSMEARING W/ 6IN RUL

## (undated) DEVICE — PAD PT POS 36 IN SURGYPAD DISP

## (undated) DEVICE — SHEET,DRAPE,53X77,STERILE: Brand: MEDLINE

## (undated) DEVICE — TIP COVER ACCESSORY